# Patient Record
Sex: FEMALE | Race: WHITE | NOT HISPANIC OR LATINO | Employment: PART TIME | URBAN - METROPOLITAN AREA
[De-identification: names, ages, dates, MRNs, and addresses within clinical notes are randomized per-mention and may not be internally consistent; named-entity substitution may affect disease eponyms.]

---

## 2024-03-25 ENCOUNTER — INITIAL PRENATAL (OUTPATIENT)
Dept: OBSTETRICS AND GYNECOLOGY | Facility: CLINIC | Age: 34
End: 2024-03-25
Payer: COMMERCIAL

## 2024-03-25 VITALS — WEIGHT: 177 LBS | BODY MASS INDEX: 25.4 KG/M2 | SYSTOLIC BLOOD PRESSURE: 113 MMHG | DIASTOLIC BLOOD PRESSURE: 75 MMHG

## 2024-03-25 DIAGNOSIS — O09.41 SUPERVISION OF PREGNANCY WITH GRAND MULTIPARITY IN FIRST TRIMESTER (HHS-HCC): Primary | ICD-10-CM

## 2024-03-25 DIAGNOSIS — Z3A.10 10 WEEKS GESTATION OF PREGNANCY (HHS-HCC): ICD-10-CM

## 2024-03-25 PROCEDURE — 0500F INITIAL PRENATAL CARE VISIT: CPT | Performed by: NURSE PRACTITIONER

## 2024-03-25 NOTE — PROGRESS NOTES
Subjective   Patient ID [id number]   Krystina Cates is a 34 y.o.  at Unknown with a working estimated date of delivery of 10/15/24 who presents for an initial prenatal visit. This pregnancy is unplanned but accepted  Pt seen at OB clinic that friends had referred, not sure where she will continue her care.  Oldest daughter is 8yo, and youngest is girl 1.5yr old.    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 for first pregnancy, has not used   gHTN end of last pregnancy  Denies h/o GDM  Last pap 2020 normal and neg HPV      OB History    Para Term  AB Living   8 6 6   1 6   SAB IAB Ectopic Multiple Live Births   1       6      # Outcome Date GA Lbr Sudheer/2nd Weight Sex Delivery Anes PTL Lv   8 Current            7 Term      Vag-Spont   RAYNE   6 Term      Vag-Spont   RAYNE   5 Term      Vag-Spont   RAYNE   4 SAB            3 Term         RAYNE   2 Term      Vag-Spont   RAYNE   1 Term      Vag-Spont   RAYNE          Objective   Physical Exam  Weight: 80.3 kg (177 lb)  Expected Total Weight Gain: Could not be calculated   Pregravid BMI: Could not be calculated  BP: 113/75          OBGyn Exam    Prenatal Labs  Ordered today  V scan done today at bedside with Dr. Landin, showing single IUP live with +FHR    Assessment/Plan   Diagnoses and all orders for this visit:  Supervision of pregnancy with grand multiparity in first trimester  -     C. Trachomatis / N. Gonorrhoeae, Amplified Detection; Future  -     CBC Anemia Panel With Reflex,Pregnancy; Future  -     Hemoglobin Identification with Path Review; Future  -     Hepatitis B surface Ag; Future  -     Hepatitis C Antibody; Future  -     HIV 1/2 Antigen/Antibody Screen with Reflex to Confirmation; Future  -     Rubella IgG; Future  -     Syphilis Screen with Reflex; Future  -     Type And Screen; Future  -     Urine culture; Future  -     US MAC OB imaging order; Future  10 weeks gestation of pregnancy      Prenatal Labs  ordered  Daily prenatal vitamins prescribed  First trimester screening and second trimester screening discussed. Patient decided to complete first check US. Discussed options for cfDNA testing, possible out of pocket costs and implications of genetic testing. Pt declines at this time.  Follow up in 4 weeks for return OB visit.

## 2024-04-12 ENCOUNTER — HOSPITAL ENCOUNTER (OUTPATIENT)
Dept: RADIOLOGY | Facility: CLINIC | Age: 34
Discharge: HOME | End: 2024-04-12
Payer: COMMERCIAL

## 2024-04-12 DIAGNOSIS — O09.41 SUPERVISION OF PREGNANCY WITH GRAND MULTIPARITY IN FIRST TRIMESTER (HHS-HCC): ICD-10-CM

## 2024-04-12 PROCEDURE — 76813 OB US NUCHAL MEAS 1 GEST: CPT | Performed by: OBSTETRICS & GYNECOLOGY

## 2024-04-12 PROCEDURE — 76813 OB US NUCHAL MEAS 1 GEST: CPT

## 2024-04-22 ENCOUNTER — ROUTINE PRENATAL (OUTPATIENT)
Dept: OBSTETRICS AND GYNECOLOGY | Facility: CLINIC | Age: 34
End: 2024-04-22
Payer: COMMERCIAL

## 2024-04-22 VITALS
WEIGHT: 178.9 LBS | DIASTOLIC BLOOD PRESSURE: 72 MMHG | SYSTOLIC BLOOD PRESSURE: 109 MMHG | BODY MASS INDEX: 25.61 KG/M2 | HEIGHT: 70 IN

## 2024-04-22 DIAGNOSIS — O09.42: Primary | ICD-10-CM

## 2024-04-22 DIAGNOSIS — Z3A.14 14 WEEKS GESTATION OF PREGNANCY (HHS-HCC): ICD-10-CM

## 2024-04-22 PROCEDURE — 0501F PRENATAL FLOW SHEET: CPT | Performed by: NURSE PRACTITIONER

## 2024-04-22 NOTE — PROGRESS NOTES
Issues today: feeling better, nausea much improved; First check US done 4/12, NT normal, S=D, needs to complete blood work, urine testing  No Vag bleeding/LOF  Random cramping Ctx/abd pain  No Dysuria/abn discharge  Discussed precautions and when to call  F/U 4 weeks, will schedule anatomy US and complete labs  Ivanna Lane, APRN-CNP

## 2024-05-07 ENCOUNTER — TELEPHONE (OUTPATIENT)
Dept: OBSTETRICS AND GYNECOLOGY | Facility: CLINIC | Age: 34
End: 2024-05-07
Payer: COMMERCIAL

## 2024-05-07 DIAGNOSIS — N90.89 VULVAR IRRITATION: Primary | ICD-10-CM

## 2024-05-07 RX ORDER — TRIAMCINOLONE ACETONIDE 1 MG/G
OINTMENT TOPICAL 2 TIMES DAILY PRN
Qty: 15 G | Refills: 1 | Status: SHIPPED | OUTPATIENT
Start: 2024-05-07

## 2024-05-07 RX ORDER — NYSTATIN 100000 U/G
OINTMENT TOPICAL 2 TIMES DAILY PRN
Qty: 15 G | Refills: 1 | Status: SHIPPED | OUTPATIENT
Start: 2024-05-07 | End: 2025-05-07

## 2024-05-07 NOTE — TELEPHONE ENCOUNTER
Called patient. Patient is 17 weeks pregnant. Patient stated she has some itchiness outside the vaginal on the labia and some redness. Patient stated she has little brown discharge patient informed that could be old blood. Patient denies any clump in her discharge and denies using any new soaps.    ----- Message from Krystina Cates sent at 5/6/2024 10:29 PM EDT -----  Regarding: Possible infection   Contact: [phone]  Lam Goncalves, a few months ago I had a yeast infection and used Monistat to clear it up with no issues. I feel like I have another one coming on but this one feels a little different. The discharge seems to be a little darker brown. This is day 1 of symptoms and I am starting to feel itchy and sore. Not sure if I should be seen or if I should just try the Monistat again. Thanks for your input!

## 2024-05-07 NOTE — TELEPHONE ENCOUNTER
Spoke to pt via phone. Pt states she had intercourse 1-2 days ago. Denies any bleeding, and feels that discharge is more like light tan and not brown or bleeding. Denies any cramping or tightening of abd. States outer labia feel irritated and itching, slight sticky light tan d/charge on underwear. Will try topicals (nystatin and triamcinolone) at this time to treat, discussed and if symptoms worsen or persist, can be seen in office for culture. Pt agrees. Ivanna Lane, APRKEENA-CNP

## 2024-05-21 ENCOUNTER — ROUTINE PRENATAL (OUTPATIENT)
Dept: OBSTETRICS AND GYNECOLOGY | Facility: CLINIC | Age: 34
End: 2024-05-21
Payer: COMMERCIAL

## 2024-05-21 ENCOUNTER — APPOINTMENT (OUTPATIENT)
Dept: OBSTETRICS AND GYNECOLOGY | Facility: CLINIC | Age: 34
End: 2024-05-21
Payer: COMMERCIAL

## 2024-05-21 VITALS
HEIGHT: 70 IN | SYSTOLIC BLOOD PRESSURE: 115 MMHG | BODY MASS INDEX: 26.16 KG/M2 | WEIGHT: 182.7 LBS | DIASTOLIC BLOOD PRESSURE: 76 MMHG

## 2024-05-21 DIAGNOSIS — Z3A.19 19 WEEKS GESTATION OF PREGNANCY (HHS-HCC): Primary | ICD-10-CM

## 2024-05-21 PROCEDURE — 0501F PRENATAL FLOW SHEET: CPT | Performed by: OBSTETRICS & GYNECOLOGY

## 2024-05-21 NOTE — PROGRESS NOTES
"Prenatal visit at 19 weeks and 0 days    Patient denies leaking fluid, bleeding or contractions. Patient admits to good fetal movement.  Patient has not had time to do blood work yet she will try to do it this week.  Anatomy scan scheduled for next week.  Complaining of varicose veins left leg.  Nontender.    Prenatal problem list:    Factor V heterozygous- no lovenox during pregnancy since first child without issue  Previous  x6, full term  H/o gHTN end last pregnancy, no GDM  Normal NT    /76 (BP Location: Left arm, Patient Position: Sitting)   Ht 1.778 m (5' 10\")   Wt 82.9 kg (182 lb 11.2 oz)   LMP 2024 (Exact Date)   BMI 26.21 kg/m²       Assessment and plan: To be scan scheduled, recommend bilateral compression hose of lower extremities, patient instructed to call office if any leg pain noted.  Patient will try to get prenatal blood work this week.  She will follow-up in 4 weeks.  "
severely cachectic/emaciated

## 2024-05-24 ENCOUNTER — HOSPITAL ENCOUNTER (OUTPATIENT)
Dept: RADIOLOGY | Facility: CLINIC | Age: 34
Discharge: HOME | End: 2024-05-24
Payer: COMMERCIAL

## 2024-05-24 DIAGNOSIS — O09.41 SUPERVISION OF PREGNANCY WITH GRAND MULTIPARITY IN FIRST TRIMESTER (HHS-HCC): ICD-10-CM

## 2024-05-24 PROCEDURE — 76805 OB US >/= 14 WKS SNGL FETUS: CPT

## 2024-05-24 PROCEDURE — 76805 OB US >/= 14 WKS SNGL FETUS: CPT | Performed by: OBSTETRICS & GYNECOLOGY

## 2024-06-14 ENCOUNTER — LAB (OUTPATIENT)
Dept: LAB | Facility: LAB | Age: 34
End: 2024-06-14
Payer: COMMERCIAL

## 2024-06-14 ENCOUNTER — HOSPITAL ENCOUNTER (OUTPATIENT)
Dept: RADIOLOGY | Facility: CLINIC | Age: 34
Discharge: HOME | End: 2024-06-14
Payer: COMMERCIAL

## 2024-06-14 DIAGNOSIS — O09.41 SUPERVISION OF PREGNANCY WITH GRAND MULTIPARITY IN FIRST TRIMESTER (HHS-HCC): ICD-10-CM

## 2024-06-14 LAB
ABO GROUP (TYPE) IN BLOOD: NORMAL
ANTIBODY SCREEN: NORMAL
ERYTHROCYTE [DISTWIDTH] IN BLOOD BY AUTOMATED COUNT: 13 % (ref 11.5–14.5)
HBV SURFACE AG SERPL QL IA: NONREACTIVE
HCT VFR BLD AUTO: 35.7 % (ref 36–46)
HCV AB SER QL: NONREACTIVE
HGB BLD-MCNC: 12.1 G/DL (ref 12–16)
HIV 1+2 AB+HIV1 P24 AG SERPL QL IA: NONREACTIVE
MCH RBC QN AUTO: 31.5 PG (ref 26–34)
MCHC RBC AUTO-ENTMCNC: 33.9 G/DL (ref 32–36)
MCV RBC AUTO: 93 FL (ref 80–100)
NRBC BLD-RTO: 0 /100 WBCS (ref 0–0)
PLATELET # BLD AUTO: 181 X10*3/UL (ref 150–450)
RBC # BLD AUTO: 3.84 X10*6/UL (ref 4–5.2)
REFLEX ADDED, ANEMIA PANEL: NORMAL
RH FACTOR (ANTIGEN D): NORMAL
RUBV IGG SERPL IA-ACNC: 1.3 IA
RUBV IGG SERPL QL IA: POSITIVE
TREPONEMA PALLIDUM IGG+IGM AB [PRESENCE] IN SERUM OR PLASMA BY IMMUNOASSAY: NONREACTIVE
WBC # BLD AUTO: 7.1 X10*3/UL (ref 4.4–11.3)

## 2024-06-14 PROCEDURE — 86317 IMMUNOASSAY INFECTIOUS AGENT: CPT

## 2024-06-14 PROCEDURE — 85027 COMPLETE CBC AUTOMATED: CPT

## 2024-06-14 PROCEDURE — 87389 HIV-1 AG W/HIV-1&-2 AB AG IA: CPT

## 2024-06-14 PROCEDURE — 76816 OB US FOLLOW-UP PER FETUS: CPT

## 2024-06-14 PROCEDURE — 87340 HEPATITIS B SURFACE AG IA: CPT

## 2024-06-14 PROCEDURE — 86780 TREPONEMA PALLIDUM: CPT

## 2024-06-14 PROCEDURE — 86900 BLOOD TYPING SEROLOGIC ABO: CPT

## 2024-06-14 PROCEDURE — 87491 CHLMYD TRACH DNA AMP PROBE: CPT

## 2024-06-14 PROCEDURE — 86803 HEPATITIS C AB TEST: CPT

## 2024-06-14 PROCEDURE — 83021 HEMOGLOBIN CHROMOTOGRAPHY: CPT

## 2024-06-14 PROCEDURE — 87591 N.GONORRHOEAE DNA AMP PROB: CPT

## 2024-06-14 PROCEDURE — 86850 RBC ANTIBODY SCREEN: CPT

## 2024-06-14 PROCEDURE — 36415 COLL VENOUS BLD VENIPUNCTURE: CPT

## 2024-06-14 PROCEDURE — 86901 BLOOD TYPING SEROLOGIC RH(D): CPT

## 2024-06-15 LAB
C TRACH RRNA SPEC QL NAA+PROBE: NEGATIVE
N GONORRHOEA DNA SPEC QL PROBE+SIG AMP: NEGATIVE

## 2024-06-18 ENCOUNTER — APPOINTMENT (OUTPATIENT)
Dept: OBSTETRICS AND GYNECOLOGY | Facility: CLINIC | Age: 34
End: 2024-06-18
Payer: COMMERCIAL

## 2024-06-18 LAB
HEMOGLOBIN A2: 3.2 % (ref 2–3.5)
HEMOGLOBIN A: 96.4 % (ref 95.8–98)
HEMOGLOBIN F: 0.4 % (ref 0–2)
HEMOGLOBIN IDENTIFICATION INTERPRETATION: NORMAL
PATH REVIEW-HGB IDENTIFICATION: NORMAL

## 2024-06-28 ENCOUNTER — APPOINTMENT (OUTPATIENT)
Dept: OBSTETRICS AND GYNECOLOGY | Facility: CLINIC | Age: 34
End: 2024-06-28
Payer: COMMERCIAL

## 2024-06-28 VITALS — WEIGHT: 187.2 LBS | BODY MASS INDEX: 26.86 KG/M2 | SYSTOLIC BLOOD PRESSURE: 120 MMHG | DIASTOLIC BLOOD PRESSURE: 75 MMHG

## 2024-06-28 DIAGNOSIS — Z3A.24 24 WEEKS GESTATION OF PREGNANCY (HHS-HCC): Primary | ICD-10-CM

## 2024-06-28 PROCEDURE — 87086 URINE CULTURE/COLONY COUNT: CPT

## 2024-06-28 PROCEDURE — 0501F PRENATAL FLOW SHEET: CPT | Performed by: OBSTETRICS & GYNECOLOGY

## 2024-06-28 NOTE — PROGRESS NOTES
Prenatal visit at 24 weeks and 3 days    Patient denies leaking fluid, bleeding or contractions. Patient admits to good fetal movement.  Follow-up anatomy ultrasound was normal.    Prenatal problem list:    Factor V heterozygous- no lovenox during pregnancy since first child without issue  Previous  x6, full term  H/o gHTN end last pregnancy, no GDM  Normal NT   US Nml anatomy, needs repeat eval of heart views- nml  Repeat growth US at 30w      /75   Wt 84.9 kg (187 lb 3.2 oz)   LMP 2024 (Exact Date)   BMI 26.86 kg/m²     Assessment and plan: Labor precautions, fetal kick counts at home, urine culture and 1 hour Glucola.  Patient will follow-up in 4 weeks.

## 2024-06-30 LAB — BACTERIA UR CULT: NORMAL

## 2024-07-15 ENCOUNTER — APPOINTMENT (OUTPATIENT)
Dept: RADIOLOGY | Facility: CLINIC | Age: 34
End: 2024-07-15
Payer: COMMERCIAL

## 2024-07-29 ENCOUNTER — LAB (OUTPATIENT)
Dept: LAB | Facility: LAB | Age: 34
End: 2024-07-29
Payer: COMMERCIAL

## 2024-07-29 ENCOUNTER — APPOINTMENT (OUTPATIENT)
Dept: OBSTETRICS AND GYNECOLOGY | Facility: CLINIC | Age: 34
End: 2024-07-29
Payer: COMMERCIAL

## 2024-07-29 VITALS
WEIGHT: 190.9 LBS | DIASTOLIC BLOOD PRESSURE: 72 MMHG | BODY MASS INDEX: 27.33 KG/M2 | HEIGHT: 70 IN | SYSTOLIC BLOOD PRESSURE: 124 MMHG

## 2024-07-29 DIAGNOSIS — Z3A.24 24 WEEKS GESTATION OF PREGNANCY (HHS-HCC): ICD-10-CM

## 2024-07-29 DIAGNOSIS — Z3A.28 28 WEEKS GESTATION OF PREGNANCY (HHS-HCC): Primary | ICD-10-CM

## 2024-07-29 LAB
ERYTHROCYTE [DISTWIDTH] IN BLOOD BY AUTOMATED COUNT: 12.5 % (ref 11.5–14.5)
GLUCOSE 1H P 50 G GLC PO SERPL-MCNC: 101 MG/DL
HCT VFR BLD AUTO: 36.1 % (ref 36–46)
HGB BLD-MCNC: 12.1 G/DL (ref 12–16)
MCH RBC QN AUTO: 31.4 PG (ref 26–34)
MCHC RBC AUTO-ENTMCNC: 33.5 G/DL (ref 32–36)
MCV RBC AUTO: 94 FL (ref 80–100)
NRBC BLD-RTO: 0 /100 WBCS (ref 0–0)
PLATELET # BLD AUTO: 157 X10*3/UL (ref 150–450)
RBC # BLD AUTO: 3.85 X10*6/UL (ref 4–5.2)
REFLEX ADDED, ANEMIA PANEL: NORMAL
WBC # BLD AUTO: 7.2 X10*3/UL (ref 4.4–11.3)

## 2024-07-29 PROCEDURE — 82947 ASSAY GLUCOSE BLOOD QUANT: CPT

## 2024-07-29 PROCEDURE — 36415 COLL VENOUS BLD VENIPUNCTURE: CPT

## 2024-07-29 PROCEDURE — 85027 COMPLETE CBC AUTOMATED: CPT

## 2024-07-29 PROCEDURE — 0501F PRENATAL FLOW SHEET: CPT | Performed by: OBSTETRICS & GYNECOLOGY

## 2024-07-29 NOTE — PROGRESS NOTES
"Prenatal visit at 28 weeks and 6 days    Patient denies leaking fluid, bleeding or contractions. Patient admits to good fetal movement.  Patient complaining of congestion and slight cough.  Denies fever or chills.  Physical exam lungs are clear bilaterally.  Patient states kids at home with similar symptoms.  Suspect virus.  Okay to use Claritin and Robitussin.    Prenatal problem list:    Factor V heterozygous- no lovenox during pregnancy since first child without issue  Previous  x6, full term  H/o gHTN end last pregnancy, no GDM  Normal NT   US Nml anatomy, needs repeat eval of heart views- nml  Repeat growth US at 30w      /72 (BP Location: Left arm, Patient Position: Sitting)   Ht 1.778 m (5' 10\")   Wt 86.6 kg (190 lb 14.4 oz)   LMP 2024 (Exact Date)   BMI 27.39 kg/m²       Assessment and plan: Labor precautions, fetal kick counts at home, Tdap deferred today due to upper respiratory symptoms.  Consider next visit.  Follow-up in 2 weeks.  "

## 2024-07-31 NOTE — RESULT ENCOUNTER NOTE
Lam Flaherty,    Just wanted to let you know that your diabetes screen and blood count came back normal. Have a good day!

## 2024-08-08 ENCOUNTER — APPOINTMENT (OUTPATIENT)
Dept: RADIOLOGY | Facility: CLINIC | Age: 34
End: 2024-08-08
Payer: COMMERCIAL

## 2024-08-09 ENCOUNTER — HOSPITAL ENCOUNTER (OUTPATIENT)
Dept: RADIOLOGY | Facility: HOSPITAL | Age: 34
Discharge: HOME | End: 2024-08-09
Payer: COMMERCIAL

## 2024-08-09 DIAGNOSIS — D68.2 FACTOR V DEFICIENCY (MULTI): ICD-10-CM

## 2024-08-09 DIAGNOSIS — O09.41 SUPERVISION OF PREGNANCY WITH GRAND MULTIPARITY IN FIRST TRIMESTER (HHS-HCC): ICD-10-CM

## 2024-08-09 DIAGNOSIS — O36.5930 MATERNAL CARE FOR OTHER KNOWN OR SUSPECTED POOR FETAL GROWTH, THIRD TRIMESTER, NOT APPLICABLE OR UNSPECIFIED (HHS-HCC): ICD-10-CM

## 2024-08-09 PROCEDURE — 76816 OB US FOLLOW-UP PER FETUS: CPT | Performed by: OBSTETRICS & GYNECOLOGY

## 2024-08-09 PROCEDURE — 76816 OB US FOLLOW-UP PER FETUS: CPT

## 2024-08-13 ENCOUNTER — APPOINTMENT (OUTPATIENT)
Dept: OBSTETRICS AND GYNECOLOGY | Facility: CLINIC | Age: 34
End: 2024-08-13
Payer: COMMERCIAL

## 2024-08-15 ENCOUNTER — ROUTINE PRENATAL (OUTPATIENT)
Dept: OBSTETRICS AND GYNECOLOGY | Facility: CLINIC | Age: 34
End: 2024-08-15
Payer: COMMERCIAL

## 2024-08-15 VITALS
DIASTOLIC BLOOD PRESSURE: 71 MMHG | BODY MASS INDEX: 27.62 KG/M2 | HEIGHT: 70 IN | SYSTOLIC BLOOD PRESSURE: 110 MMHG | WEIGHT: 192.9 LBS

## 2024-08-15 DIAGNOSIS — Z71.85 IMMUNIZATION COUNSELING: ICD-10-CM

## 2024-08-15 DIAGNOSIS — Z23 NEED FOR DIPHTHERIA-TETANUS-PERTUSSIS (TDAP) VACCINE: ICD-10-CM

## 2024-08-15 DIAGNOSIS — Z3A.31 31 WEEKS GESTATION OF PREGNANCY (HHS-HCC): Primary | ICD-10-CM

## 2024-08-15 PROCEDURE — 0501F PRENATAL FLOW SHEET: CPT | Performed by: OBSTETRICS & GYNECOLOGY

## 2024-08-15 PROCEDURE — 90715 TDAP VACCINE 7 YRS/> IM: CPT | Performed by: OBSTETRICS & GYNECOLOGY

## 2024-08-15 PROCEDURE — 90471 IMMUNIZATION ADMIN: CPT | Performed by: OBSTETRICS & GYNECOLOGY

## 2024-08-15 NOTE — PROGRESS NOTES
"Prenatal visit at 31 weeks and 2 days    Patient denies leaking fluid, bleeding or contractions. Patient admits to good fetal movement.  Reviewed normal growth ultrasound.    Prenatal problem list:    Factor V heterozygous- no lovenox during pregnancy since first child without issue  Previous  x6, full term  H/o gHTN end last pregnancy, no GDM  Normal NT   US Nml anatomy, needs repeat eval of heart views- nml  Repeat growth US at 30w, nml anatomy    /71 (BP Location: Left arm, Patient Position: Sitting, BP Cuff Size: Large adult)   Ht 1.778 m (5' 10\")   Wt 87.5 kg (192 lb 14.4 oz)   LMP 2024 (Exact Date)   BMI 27.68 kg/m²     Patient was counseled on the recommendation for and benefits of Tdap vaccination during pregnancy.  We discussed the passive immunity that occurs after maternal vaccination during pregnancy, and the antibodies that cross the placenta to the fetus to protect baby in the first few months of life.  Babies do not receive their first vaccine for pertussis/whooping cough until 2 months of life, during which the baby is vulnerable to this bacterial infection.  Whooping cough can cause severe and even life-threatening infections, and maternal vaccination between 27 and 36 weeks of pregnancy is the best method to protect baby from whooping cough until they are eligible for the vaccine.  After discussion the patient accepts the vaccine.  Greater than 5 minutes were spent on counseling related to vaccine recommendations and safety.    Assessment and plan: Labor precautions, fetal kick counts at home, Tdap today, follow-up in 2 weeks.  "

## 2024-08-26 ENCOUNTER — APPOINTMENT (OUTPATIENT)
Dept: OBSTETRICS AND GYNECOLOGY | Facility: CLINIC | Age: 34
End: 2024-08-26
Payer: COMMERCIAL

## 2024-08-26 VITALS — SYSTOLIC BLOOD PRESSURE: 112 MMHG | BODY MASS INDEX: 28.09 KG/M2 | WEIGHT: 195.8 LBS | DIASTOLIC BLOOD PRESSURE: 71 MMHG

## 2024-08-26 DIAGNOSIS — Z3A.32 32 WEEKS GESTATION OF PREGNANCY (HHS-HCC): Primary | ICD-10-CM

## 2024-08-26 PROCEDURE — 0501F PRENATAL FLOW SHEET: CPT | Performed by: OBSTETRICS & GYNECOLOGY

## 2024-08-26 NOTE — PROGRESS NOTES
Prenatal visit at 32 weeks and 6 days    Patient denies leaking fluid, bleeding or contractions. Patient admits to good fetal movement.    Prenatal problem list:    Factor V heterozygous- no lovenox during pregnancy since first child without issue  Previous  x6, full term  H/o gHTN end last pregnancy, no GDM  Normal NT   US Nml anatomy, needs repeat eval of heart views- nml  Repeat growt/h US at 30w, nml anatomy  TDAP Given -> 8/15/24 - AC    Assessment and plan: Labor precautions, fetal kick counts at home, follow-up in 2 weeks.

## 2024-09-09 ENCOUNTER — OFFICE VISIT (OUTPATIENT)
Dept: OBSTETRICS AND GYNECOLOGY | Facility: CLINIC | Age: 34
End: 2024-09-09
Payer: COMMERCIAL

## 2024-09-09 VITALS
WEIGHT: 196.4 LBS | SYSTOLIC BLOOD PRESSURE: 107 MMHG | DIASTOLIC BLOOD PRESSURE: 70 MMHG | BODY MASS INDEX: 28.12 KG/M2 | HEIGHT: 70 IN

## 2024-09-09 DIAGNOSIS — Z3A.34 34 WEEKS GESTATION OF PREGNANCY (HHS-HCC): Primary | ICD-10-CM

## 2024-09-09 PROCEDURE — 87205 SMEAR GRAM STAIN: CPT

## 2024-09-09 PROCEDURE — 3008F BODY MASS INDEX DOCD: CPT | Performed by: OBSTETRICS & GYNECOLOGY

## 2024-09-09 PROCEDURE — 99213 OFFICE O/P EST LOW 20 MIN: CPT | Performed by: OBSTETRICS & GYNECOLOGY

## 2024-09-09 PROCEDURE — 1036F TOBACCO NON-USER: CPT | Performed by: OBSTETRICS & GYNECOLOGY

## 2024-09-09 NOTE — PROGRESS NOTES
"Prenatal visit at 34 weeks and 6 days    Patient denies leaking fluid, bleeding or contractions. Patient admits to good fetal movement.  Complaining of several vaginal infections throughout pregnancy.  Usually diagnosed as yeast, responds well to Monistat vaginal cream then returns within a few weeks.    Prenatal problem list:    Factor V heterozygous- no lovenox during pregnancy since first child without issue  Previous  x6, full term  H/o gHTN end last pregnancy, no GDM  Normal NT   US Nml anatomy, needs repeat eval of heart views- nml  Repeat growt/h US at 30w, nml anatomy  TDAP Given -> 8/15/24 - AC    /70 (BP Location: Left arm, Patient Position: Sitting)   Ht 1.778 m (5' 10\")   Wt 89.1 kg (196 lb 6.4 oz)   LMP 2024 (Exact Date)   BMI 28.18 kg/m²     Pelvic exam: External genitalia mildly erythematous, vagina with a pasty white thick discharge, cervix visually appears closed.  BV and yeast cultures done.    Assessment and plan: Labor precautions, fetal kick counts at home, okay to use Aveeno bath oatmeal and Monistat.  Will contact patient with culture results.  Follow-up in 1 week.  "

## 2024-09-10 ENCOUNTER — APPOINTMENT (OUTPATIENT)
Dept: OBSTETRICS AND GYNECOLOGY | Facility: CLINIC | Age: 34
End: 2024-09-10
Payer: COMMERCIAL

## 2024-09-11 LAB
CLUE CELLS VAG LPF-#/AREA: ABNORMAL /[LPF]
NUGENT SCORE: 0
YEAST VAG WET PREP-#/AREA: PRESENT

## 2024-09-13 ENCOUNTER — TELEPHONE (OUTPATIENT)
Dept: OBSTETRICS AND GYNECOLOGY | Facility: CLINIC | Age: 34
End: 2024-09-13
Payer: COMMERCIAL

## 2024-09-13 DIAGNOSIS — B37.31 YEAST VAGINITIS: Primary | ICD-10-CM

## 2024-09-13 RX ORDER — FLUCONAZOLE 150 MG/1
TABLET ORAL
Qty: 2 TABLET | Refills: 0 | Status: SHIPPED | OUTPATIENT
Start: 2024-09-13

## 2024-09-13 NOTE — TELEPHONE ENCOUNTER
Spoke to patient on the phone.  Reviewed recent vaginal culture positive for yeast.  Patient has been having recurrent yeast infection during pregnancy with only temporary relief with Monistat.  Will treat with Diflucan x 2.  She will follow-up for her routine OB visits.

## 2024-09-13 NOTE — TELEPHONE ENCOUNTER
Dr. Landin,   OB Patient calling for 09-09-24 Vaginal Culture results.  Patient noticed on My Chart that the results are abnormal.  She also was hoping for a return call before the weekend.     Patient number:  [phone].     Thank you

## 2024-09-17 ENCOUNTER — APPOINTMENT (OUTPATIENT)
Dept: OBSTETRICS AND GYNECOLOGY | Facility: CLINIC | Age: 34
End: 2024-09-17
Payer: COMMERCIAL

## 2024-09-17 VITALS — SYSTOLIC BLOOD PRESSURE: 105 MMHG | BODY MASS INDEX: 28.35 KG/M2 | WEIGHT: 197.6 LBS | DIASTOLIC BLOOD PRESSURE: 70 MMHG

## 2024-09-17 DIAGNOSIS — O09.43: Primary | ICD-10-CM

## 2024-09-17 DIAGNOSIS — Z3A.36 36 WEEKS GESTATION OF PREGNANCY (HHS-HCC): ICD-10-CM

## 2024-09-17 PROCEDURE — 87081 CULTURE SCREEN ONLY: CPT

## 2024-09-17 PROCEDURE — 0501F PRENATAL FLOW SHEET: CPT | Performed by: NURSE PRACTITIONER

## 2024-09-17 NOTE — PROGRESS NOTES
Issues today: feeling good, ctx at times, but nothing regular; treated recently for yeast, took second dose yesterday evening and feeling better, worried it will return again  GBS discussed and obtained today  Planning BF, natural family planning, peds in [place]  No Vag bleeding/LOF  Occas Ctx/abd pain  No Dysuria/abn discharge  FM's: active  Discussed PTL precautions/FKC's and when to call  F/U 1 week, GBS sent  WILLOW Brody-CNP

## 2024-09-21 LAB — GP B STREP GENITAL QL CULT: NORMAL

## 2024-09-24 ENCOUNTER — APPOINTMENT (OUTPATIENT)
Dept: OBSTETRICS AND GYNECOLOGY | Facility: CLINIC | Age: 34
End: 2024-09-24
Payer: COMMERCIAL

## 2024-09-24 VITALS — WEIGHT: 197.3 LBS | SYSTOLIC BLOOD PRESSURE: 114 MMHG | DIASTOLIC BLOOD PRESSURE: 73 MMHG | BODY MASS INDEX: 28.31 KG/M2

## 2024-09-24 DIAGNOSIS — O09.43: Primary | ICD-10-CM

## 2024-09-24 DIAGNOSIS — Z3A.37 37 WEEKS GESTATION OF PREGNANCY (HHS-HCC): ICD-10-CM

## 2024-09-24 PROCEDURE — 0501F PRENATAL FLOW SHEET: CPT | Performed by: NURSE PRACTITIONER

## 2024-10-01 ENCOUNTER — ANESTHESIA (OUTPATIENT)
Dept: OBSTETRICS AND GYNECOLOGY | Facility: HOSPITAL | Age: 34
End: 2024-10-01
Payer: COMMERCIAL

## 2024-10-01 ENCOUNTER — ANESTHESIA EVENT (OUTPATIENT)
Dept: OBSTETRICS AND GYNECOLOGY | Facility: HOSPITAL | Age: 34
End: 2024-10-01
Payer: COMMERCIAL

## 2024-10-01 ENCOUNTER — ROUTINE PRENATAL (OUTPATIENT)
Dept: OBSTETRICS AND GYNECOLOGY | Facility: CLINIC | Age: 34
End: 2024-10-01
Payer: COMMERCIAL

## 2024-10-01 ENCOUNTER — HOSPITAL ENCOUNTER (OUTPATIENT)
Dept: VASCULAR MEDICINE | Facility: HOSPITAL | Age: 34
Discharge: HOME | End: 2024-10-01
Payer: COMMERCIAL

## 2024-10-01 ENCOUNTER — HOSPITAL ENCOUNTER (INPATIENT)
Facility: HOSPITAL | Age: 34
LOS: 3 days | Discharge: HOME | End: 2024-10-04
Attending: SPECIALIST | Admitting: SPECIALIST
Payer: COMMERCIAL

## 2024-10-01 VITALS — BODY MASS INDEX: 28.84 KG/M2 | WEIGHT: 201 LBS | DIASTOLIC BLOOD PRESSURE: 75 MMHG | SYSTOLIC BLOOD PRESSURE: 115 MMHG

## 2024-10-01 DIAGNOSIS — R60.0 LOCALIZED EDEMA: ICD-10-CM

## 2024-10-01 DIAGNOSIS — Z3A.38 38 WEEKS GESTATION OF PREGNANCY (HHS-HCC): ICD-10-CM

## 2024-10-01 DIAGNOSIS — O09.43: Primary | ICD-10-CM

## 2024-10-01 DIAGNOSIS — I82.4Y2 ACUTE DEEP VEIN THROMBOSIS (DVT) OF PROXIMAL VEIN OF LEFT LOWER EXTREMITY (MULTI): Primary | ICD-10-CM

## 2024-10-01 DIAGNOSIS — M79.605 ACUTE LEG PAIN, LEFT: ICD-10-CM

## 2024-10-01 DIAGNOSIS — O09.43: ICD-10-CM

## 2024-10-01 PROBLEM — I82.409 DVT (DEEP VENOUS THROMBOSIS) (MULTI): Status: ACTIVE | Noted: 2024-10-01

## 2024-10-01 LAB
ABO GROUP (TYPE) IN BLOOD: NORMAL
ANTIBODY SCREEN: NORMAL
ERYTHROCYTE [DISTWIDTH] IN BLOOD BY AUTOMATED COUNT: 12.8 % (ref 11.5–14.5)
HCT VFR BLD AUTO: 34.7 % (ref 36–46)
HGB BLD-MCNC: 11.4 G/DL (ref 12–16)
MCH RBC QN AUTO: 28.8 PG (ref 26–34)
MCHC RBC AUTO-ENTMCNC: 32.9 G/DL (ref 32–36)
MCV RBC AUTO: 88 FL (ref 80–100)
NRBC BLD-RTO: 0 /100 WBCS (ref 0–0)
PLATELET # BLD AUTO: 164 X10*3/UL (ref 150–450)
RBC # BLD AUTO: 3.96 X10*6/UL (ref 4–5.2)
RH FACTOR (ANTIGEN D): NORMAL
TREPONEMA PALLIDUM IGG+IGM AB [PRESENCE] IN SERUM OR PLASMA BY IMMUNOASSAY: NONREACTIVE
WBC # BLD AUTO: 7.9 X10*3/UL (ref 4.4–11.3)

## 2024-10-01 PROCEDURE — 99223 1ST HOSP IP/OBS HIGH 75: CPT

## 2024-10-01 PROCEDURE — 86901 BLOOD TYPING SEROLOGIC RH(D): CPT

## 2024-10-01 PROCEDURE — 7210000002 HC LABOR PER HOUR

## 2024-10-01 PROCEDURE — 1120000001 HC OB PRIVATE ROOM DAILY

## 2024-10-01 PROCEDURE — 0501F PRENATAL FLOW SHEET: CPT | Performed by: NURSE PRACTITIONER

## 2024-10-01 PROCEDURE — 36415 COLL VENOUS BLD VENIPUNCTURE: CPT

## 2024-10-01 PROCEDURE — 86780 TREPONEMA PALLIDUM: CPT

## 2024-10-01 PROCEDURE — 93970 EXTREMITY STUDY: CPT | Performed by: SURGERY

## 2024-10-01 PROCEDURE — 99221 1ST HOSP IP/OBS SF/LOW 40: CPT | Performed by: STUDENT IN AN ORGANIZED HEALTH CARE EDUCATION/TRAINING PROGRAM

## 2024-10-01 PROCEDURE — 2500000001 HC RX 250 WO HCPCS SELF ADMINISTERED DRUGS (ALT 637 FOR MEDICARE OP)

## 2024-10-01 PROCEDURE — 3E033VJ INTRODUCTION OF OTHER HORMONE INTO PERIPHERAL VEIN, PERCUTANEOUS APPROACH: ICD-10-PCS | Performed by: OBSTETRICS & GYNECOLOGY

## 2024-10-01 PROCEDURE — 85027 COMPLETE CBC AUTOMATED: CPT

## 2024-10-01 PROCEDURE — 2500000004 HC RX 250 GENERAL PHARMACY W/ HCPCS (ALT 636 FOR OP/ED)

## 2024-10-01 PROCEDURE — 2500000005 HC RX 250 GENERAL PHARMACY W/O HCPCS: Performed by: ANESTHESIOLOGY

## 2024-10-01 PROCEDURE — 93970 EXTREMITY STUDY: CPT

## 2024-10-01 PROCEDURE — 3700000014 EPIDURAL BLOCK: Performed by: ANESTHESIOLOGY

## 2024-10-01 RX ORDER — MISOPROSTOL 200 UG/1
800 TABLET ORAL ONCE AS NEEDED
Status: DISCONTINUED | OUTPATIENT
Start: 2024-10-01 | End: 2024-10-02 | Stop reason: HOSPADM

## 2024-10-01 RX ORDER — LOPERAMIDE HYDROCHLORIDE 2 MG/1
4 CAPSULE ORAL EVERY 2 HOUR PRN
Status: DISCONTINUED | OUTPATIENT
Start: 2024-10-01 | End: 2024-10-02 | Stop reason: HOSPADM

## 2024-10-01 RX ORDER — FENTANYL/BUPIVACAINE/NS/PF 2MCG/ML-.1
PLASTIC BAG, INJECTION (ML) INJECTION CONTINUOUS PRN
Status: DISCONTINUED | OUTPATIENT
Start: 2024-10-01 | End: 2024-10-02

## 2024-10-01 RX ORDER — OXYTOCIN 10 [USP'U]/ML
10 INJECTION, SOLUTION INTRAMUSCULAR; INTRAVENOUS ONCE AS NEEDED
Status: DISCONTINUED | OUTPATIENT
Start: 2024-10-01 | End: 2024-10-02 | Stop reason: HOSPADM

## 2024-10-01 RX ORDER — NIFEDIPINE 10 MG/1
10 CAPSULE ORAL ONCE AS NEEDED
Status: DISCONTINUED | OUTPATIENT
Start: 2024-10-01 | End: 2024-10-02 | Stop reason: HOSPADM

## 2024-10-01 RX ORDER — LABETALOL HYDROCHLORIDE 5 MG/ML
20 INJECTION, SOLUTION INTRAVENOUS ONCE AS NEEDED
Status: DISCONTINUED | OUTPATIENT
Start: 2024-10-01 | End: 2024-10-02 | Stop reason: HOSPADM

## 2024-10-01 RX ORDER — OXYTOCIN/0.9 % SODIUM CHLORIDE 30/500 ML
60 PLASTIC BAG, INJECTION (ML) INTRAVENOUS ONCE AS NEEDED
Status: COMPLETED | OUTPATIENT
Start: 2024-10-01 | End: 2024-10-02

## 2024-10-01 RX ORDER — ONDANSETRON HYDROCHLORIDE 2 MG/ML
4 INJECTION, SOLUTION INTRAVENOUS EVERY 6 HOURS PRN
Status: DISCONTINUED | OUTPATIENT
Start: 2024-10-01 | End: 2024-10-02

## 2024-10-01 RX ORDER — FENTANYL/BUPIVACAINE/NS/PF 2MCG/ML-.1
0-54 PLASTIC BAG, INJECTION (ML) INJECTION CONTINUOUS
Status: DISCONTINUED | OUTPATIENT
Start: 2024-10-01 | End: 2024-10-02

## 2024-10-01 RX ORDER — TERBUTALINE SULFATE 1 MG/ML
0.25 INJECTION SUBCUTANEOUS ONCE AS NEEDED
Status: DISCONTINUED | OUTPATIENT
Start: 2024-10-01 | End: 2024-10-02 | Stop reason: HOSPADM

## 2024-10-01 RX ORDER — ACETAMINOPHEN 325 MG/1
975 TABLET ORAL ONCE
Status: COMPLETED | OUTPATIENT
Start: 2024-10-01 | End: 2024-10-01

## 2024-10-01 RX ORDER — LIDOCAINE HYDROCHLORIDE 10 MG/ML
30 INJECTION, SOLUTION INFILTRATION; PERINEURAL ONCE AS NEEDED
Status: DISCONTINUED | OUTPATIENT
Start: 2024-10-01 | End: 2024-10-02 | Stop reason: HOSPADM

## 2024-10-01 RX ORDER — METHYLERGONOVINE MALEATE 0.2 MG/ML
0.2 INJECTION INTRAVENOUS ONCE AS NEEDED
Status: DISCONTINUED | OUTPATIENT
Start: 2024-10-01 | End: 2024-10-02 | Stop reason: HOSPADM

## 2024-10-01 RX ORDER — METOCLOPRAMIDE 10 MG/1
10 TABLET ORAL EVERY 6 HOURS PRN
Status: DISCONTINUED | OUTPATIENT
Start: 2024-10-01 | End: 2024-10-02

## 2024-10-01 RX ORDER — ONDANSETRON 4 MG/1
4 TABLET, FILM COATED ORAL EVERY 6 HOURS PRN
Status: DISCONTINUED | OUTPATIENT
Start: 2024-10-01 | End: 2024-10-02

## 2024-10-01 RX ORDER — CARBOPROST TROMETHAMINE 250 UG/ML
250 INJECTION, SOLUTION INTRAMUSCULAR ONCE AS NEEDED
Status: DISCONTINUED | OUTPATIENT
Start: 2024-10-01 | End: 2024-10-02 | Stop reason: HOSPADM

## 2024-10-01 RX ORDER — OXYTOCIN/0.9 % SODIUM CHLORIDE 30/500 ML
2-30 PLASTIC BAG, INJECTION (ML) INTRAVENOUS CONTINUOUS
Status: DISCONTINUED | OUTPATIENT
Start: 2024-10-01 | End: 2024-10-02

## 2024-10-01 RX ORDER — FENTANYL/BUPIVACAINE/NS/PF 2MCG/ML-.1
PLASTIC BAG, INJECTION (ML) INJECTION AS NEEDED
Status: DISCONTINUED | OUTPATIENT
Start: 2024-10-01 | End: 2024-10-02

## 2024-10-01 RX ORDER — TRANEXAMIC ACID 100 MG/ML
1000 INJECTION, SOLUTION INTRAVENOUS ONCE AS NEEDED
Status: DISCONTINUED | OUTPATIENT
Start: 2024-10-01 | End: 2024-10-02 | Stop reason: HOSPADM

## 2024-10-01 RX ORDER — SODIUM CHLORIDE, SODIUM LACTATE, POTASSIUM CHLORIDE, CALCIUM CHLORIDE 600; 310; 30; 20 MG/100ML; MG/100ML; MG/100ML; MG/100ML
125 INJECTION, SOLUTION INTRAVENOUS CONTINUOUS
Status: DISCONTINUED | OUTPATIENT
Start: 2024-10-01 | End: 2024-10-02

## 2024-10-01 RX ORDER — HYDRALAZINE HYDROCHLORIDE 20 MG/ML
5 INJECTION INTRAMUSCULAR; INTRAVENOUS ONCE AS NEEDED
Status: DISCONTINUED | OUTPATIENT
Start: 2024-10-01 | End: 2024-10-02 | Stop reason: HOSPADM

## 2024-10-01 RX ORDER — METOCLOPRAMIDE HYDROCHLORIDE 5 MG/ML
10 INJECTION INTRAMUSCULAR; INTRAVENOUS EVERY 6 HOURS PRN
Status: DISCONTINUED | OUTPATIENT
Start: 2024-10-01 | End: 2024-10-02

## 2024-10-01 SDOH — ECONOMIC STABILITY: INCOME INSECURITY: HOW HARD IS IT FOR YOU TO PAY FOR THE VERY BASICS LIKE FOOD, HOUSING, MEDICAL CARE, AND HEATING?: NOT VERY HARD

## 2024-10-01 SDOH — SOCIAL STABILITY: SOCIAL INSECURITY
WITHIN THE LAST YEAR, HAVE TO BEEN RAPED OR FORCED TO HAVE ANY KIND OF SEXUAL ACTIVITY BY YOUR PARTNER OR EX-PARTNER?: NO

## 2024-10-01 SDOH — HEALTH STABILITY: MENTAL HEALTH
HOW OFTEN DO YOU NEED TO HAVE SOMEONE HELP YOU WHEN YOU READ INSTRUCTIONS, PAMPHLETS, OR OTHER WRITTEN MATERIAL FROM YOUR DOCTOR OR PHARMACY?: NEVER

## 2024-10-01 SDOH — SOCIAL STABILITY: SOCIAL INSECURITY: VERBAL ABUSE: DENIES

## 2024-10-01 SDOH — SOCIAL STABILITY: SOCIAL INSECURITY: ARE YOU OR HAVE YOU BEEN THREATENED OR ABUSED PHYSICALLY, EMOTIONALLY, OR SEXUALLY BY ANYONE?: NO

## 2024-10-01 SDOH — SOCIAL STABILITY: SOCIAL INSECURITY: WITHIN THE LAST YEAR, HAVE YOU BEEN HUMILIATED OR EMOTIONALLY ABUSED IN OTHER WAYS BY YOUR PARTNER OR EX-PARTNER?: NO

## 2024-10-01 SDOH — ECONOMIC STABILITY: FOOD INSECURITY: WITHIN THE PAST 12 MONTHS, THE FOOD YOU BOUGHT JUST DIDN'T LAST AND YOU DIDN'T HAVE MONEY TO GET MORE.: NEVER TRUE

## 2024-10-01 SDOH — HEALTH STABILITY: MENTAL HEALTH: HAVE YOU USED ANY PRESCRIPTION DRUGS OTHER THAN PRESCRIBED IN THE PAST 12 MONTHS?: NO

## 2024-10-01 SDOH — HEALTH STABILITY: MENTAL HEALTH: SUICIDAL BEHAVIOR (LIFETIME): NO

## 2024-10-01 SDOH — HEALTH STABILITY: MENTAL HEALTH: WERE YOU ABLE TO COMPLETE ALL THE BEHAVIORAL HEALTH SCREENINGS?: NO

## 2024-10-01 SDOH — ECONOMIC STABILITY: HOUSING INSECURITY: DO YOU FEEL UNSAFE GOING BACK TO THE PLACE WHERE YOU ARE LIVING?: NO

## 2024-10-01 SDOH — SOCIAL STABILITY: SOCIAL INSECURITY: WITHIN THE LAST YEAR, HAVE YOU BEEN AFRAID OF YOUR PARTNER OR EX-PARTNER?: NO

## 2024-10-01 SDOH — SOCIAL STABILITY: SOCIAL INSECURITY
WITHIN THE LAST YEAR, HAVE YOU BEEN KICKED, HIT, SLAPPED, OR OTHERWISE PHYSICALLY HURT BY YOUR PARTNER OR EX-PARTNER?: NO

## 2024-10-01 SDOH — SOCIAL STABILITY: SOCIAL INSECURITY: PHYSICAL ABUSE: DENIES

## 2024-10-01 SDOH — ECONOMIC STABILITY: FOOD INSECURITY: WITHIN THE PAST 12 MONTHS, YOU WORRIED THAT YOUR FOOD WOULD RUN OUT BEFORE YOU GOT MONEY TO BUY MORE.: NEVER TRUE

## 2024-10-01 SDOH — SOCIAL STABILITY: SOCIAL INSECURITY: DO YOU FEEL ANYONE HAS EXPLOITED OR TAKEN ADVANTAGE OF YOU FINANCIALLY OR OF YOUR PERSONAL PROPERTY?: NO

## 2024-10-01 SDOH — HEALTH STABILITY: MENTAL HEALTH: HAVE YOU USED ANY SUBSTANCES (CANABIS, COCAINE, HEROIN, HALLUCINOGENS, INHALANTS, ETC.) IN THE PAST 12 MONTHS?: NO

## 2024-10-01 SDOH — SOCIAL STABILITY: SOCIAL INSECURITY: ARE THERE ANY APPARENT SIGNS OF INJURIES/BEHAVIORS THAT COULD BE RELATED TO ABUSE/NEGLECT?: NO

## 2024-10-01 SDOH — HEALTH STABILITY: MENTAL HEALTH: CURRENT SMOKER: 0

## 2024-10-01 SDOH — SOCIAL STABILITY: SOCIAL INSECURITY: HAS ANYONE EVER THREATENED TO HURT YOUR FAMILY OR YOUR PETS?: NO

## 2024-10-01 SDOH — SOCIAL STABILITY: SOCIAL INSECURITY: ABUSE SCREEN: ADULT

## 2024-10-01 SDOH — SOCIAL STABILITY: SOCIAL INSECURITY: HAVE YOU HAD ANY THOUGHTS OF HARMING ANYONE ELSE?: NO

## 2024-10-01 SDOH — HEALTH STABILITY: MENTAL HEALTH: WISH TO BE DEAD (PAST 1 MONTH): NO

## 2024-10-01 SDOH — ECONOMIC STABILITY: TRANSPORTATION INSECURITY
IN THE PAST 12 MONTHS, HAS THE LACK OF TRANSPORTATION KEPT YOU FROM MEDICAL APPOINTMENTS OR FROM GETTING MEDICATIONS?: NO

## 2024-10-01 SDOH — ECONOMIC STABILITY: TRANSPORTATION INSECURITY
IN THE PAST 12 MONTHS, HAS LACK OF TRANSPORTATION KEPT YOU FROM MEETINGS, WORK, OR FROM GETTING THINGS NEEDED FOR DAILY LIVING?: NO

## 2024-10-01 SDOH — SOCIAL STABILITY: SOCIAL INSECURITY: HAVE YOU HAD THOUGHTS OF HARMING ANYONE ELSE?: NO

## 2024-10-01 SDOH — HEALTH STABILITY: MENTAL HEALTH: NON-SPECIFIC ACTIVE SUICIDAL THOUGHTS (PAST 1 MONTH): NO

## 2024-10-01 SDOH — SOCIAL STABILITY: SOCIAL INSECURITY: DOES ANYONE TRY TO KEEP YOU FROM HAVING/CONTACTING OTHER FRIENDS OR DOING THINGS OUTSIDE YOUR HOME?: NO

## 2024-10-01 ASSESSMENT — PAIN SCALES - GENERAL
PAINLEVEL_OUTOF10: 0 - NO PAIN
PAINLEVEL_OUTOF10: 4
PAINLEVEL_OUTOF10: 4
PAINLEVEL_OUTOF10: 0 - NO PAIN
PAINLEVEL_OUTOF10: 1
PAINLEVEL_OUTOF10: 0 - NO PAIN

## 2024-10-01 ASSESSMENT — PATIENT HEALTH QUESTIONNAIRE - PHQ9
1. LITTLE INTEREST OR PLEASURE IN DOING THINGS: NOT AT ALL
2. FEELING DOWN, DEPRESSED OR HOPELESS: NOT AT ALL
SUM OF ALL RESPONSES TO PHQ9 QUESTIONS 1 & 2: 0

## 2024-10-01 ASSESSMENT — PAIN DESCRIPTION - LOCATION: LOCATION: HEAD

## 2024-10-01 ASSESSMENT — LIFESTYLE VARIABLES
AUDIT-C TOTAL SCORE: 0
HOW OFTEN DO YOU HAVE A DRINK CONTAINING ALCOHOL: NEVER
HOW OFTEN DO YOU HAVE 6 OR MORE DRINKS ON ONE OCCASION: NEVER
SKIP TO QUESTIONS 9-10: 1
AUDIT-C TOTAL SCORE: 0
HOW MANY STANDARD DRINKS CONTAINING ALCOHOL DO YOU HAVE ON A TYPICAL DAY: PATIENT DOES NOT DRINK

## 2024-10-01 NOTE — CARE PLAN
Problem: Vaginal Birth or  Section  Goal: Fetal and maternal status remain reassuring during the birth process  Outcome: Progressing  Goal: Prevention of malpresentation/labor dystocia through delivery  Outcome: Progressing  Goal: Demonstrates labor coping techniques through delivery  Outcome: Progressing  Goal: Minimal s/sx of HDP and BP<160/110  Outcome: Progressing  Goal: No s/sx of infection through recovery  Outcome: Progressing  Goal: No s/sx of hemorrhage through recovery  Outcome: Progressing     Problem: Nausea/Vomiting  Goal: Free from nausea/vomiting  Outcome: Progressing     Problem: Pain - Adult  Goal: Verbalizes/displays adequate comfort level or baseline comfort level  Outcome: Progressing     Problem: Safety - Adult  Goal: Free from fall injury  Outcome: Progressing

## 2024-10-01 NOTE — H&P
OB Admission H&P    Assessment/Plan    Krystina Cates is a 34 y.o.  at 38w0d by LMP c/w 13wks who presents for Induction of Labor    New DVT  - Duplex US ordered outpatient 10/1, demonstrated DVT in distal external iliac, common femoral, profunda, proximal femoral, mid femoral, distal femoral and popliteal veins.   - Sent to L&D for further management  - Discussed with MFM team, who recommends proceeding with IOL and then starting therapeutic AC after delivery  - Pt plans for epidural. Will plan to remove epidural after delivery and wait one hour and then start a therapeutic heparin drip    IOL  -Admit to L&D, consented  -T&S, CBC, and Syphilis  -Epidural at patient request  -Recheck as clinically indicated by maternal or fetal status  -Plan to initiate induction with pitocin given favorable cervical exam and multip    Fetal Status  -NST reactive, reassuring   -Presentation cephalic based on ultrasound  -EFW 6.5# by leopolds  -GBS neg    Postpartum:  -Contraception Plan: patient declined    Pregnancy Problems (from 24 to present)       Problem Noted Resolved    Labor and delivery indication for care or intervention ([facility]) 10/1/2024 by Lizy Acosta MD No    Priority:  Medium              Subjective   Good fetal movement.  Denies vaginal bleeding., Denies contractions., Denies leaking of fluid.      Prenatal Provider [place]    Pregnancy notable for:  - Newly diagnosed DVT: on duplex US 10/1  - FVL heterozygote - was on lovenox only in 1st pregnancy  - Grand multip    OB History    Para Term  AB Living   8 6 6 0 1 6   SAB IAB Ectopic Multiple Live Births   1 0 0 0 6      # Outcome Date GA Lbr Sudheer/2nd Weight Sex Type Anes PTL Lv   8 Current            7 Term      Vag-Spont   RAYNE   6 Term      Vag-Spont   RAYNE   5 Term      Vag-Spont   RAYNE   4 SAB            3 Term      Vag-Spont   RAYNE   2 Term      Vag-Spont   RAYNE   1 Term      Vag-Spont   RAYNE       History reviewed. No pertinent  surgical history.    Social History     Tobacco Use    Smoking status: Never    Smokeless tobacco: Never   Substance Use Topics    Alcohol use: Not on file       No Known Allergies    Medications Prior to Admission   Medication Sig Dispense Refill Last Dose    fluconazole (Diflucan) 150 mg tablet Take 1 tablet now then repeat in 72 hours. 2 tablet 0 Past Month    prenatal vit no.124/iron/folic (PRENATAL VITAMIN ORAL) Take by mouth once daily.   9/30/2024    nystatin (Mycostatin) ointment Apply topically 2 times a day as needed (irritation). (Patient not taking: Reported on 10/1/2024) 15 g 1 More than a month    triamcinolone (Kenalog) 0.1 % ointment Apply topically 2 times a day as needed for irritation. (Patient not taking: Reported on 10/1/2024) 15 g 1 More than a month     Objective     Last Vitals  Temp Pulse Resp BP MAP O2 Sat   36.5 °C (97.7 °F) 102 16 136/77 100 98 %     Blood Pressures         10/1/2024  1523             BP: 136/77             Physical Exam  General: NAD, mood appropriate  Cardiopulmonary: warm and well perfused, breathing comfortably on room air  Abdomen: Gravid, non-tender  Extremities: Symmetric  Speculum Exam: deferred  Cervix: 2 /50 /-3      Fetal Monitoring  Baseline: 140 bpm, Variability: moderate,  Accelerations: present and Decelerations: none  Uterine Activity: Irregular contractions  Interpretation: Reactive    Bedside ultrasound: Yes    Labs in chart were reviewed.   Results from last 7 days   Lab Units 10/01/24  1537   WBC AUTO x10*3/uL 7.9   HEMOGLOBIN g/dL 11.4*   HEMATOCRIT % 34.7*   PLATELETS AUTO x10*3/uL 164        Prenatal labs reviewed, not remarkable.

## 2024-10-01 NOTE — PROGRESS NOTES
Issues today: feeling more BH and cramping, good FM. Reports left calf pain, sore cramping, little more edema on that side L>R, no erythema, but noted increased superficial varicosities just below back of knee of left leg >R since yesterday. Skin normal temp to touch bilat. Pt does have h/o Factor V, not on lovenox  No Vag bleeding/LOF  Irreg BH Ctx/abd pain  No Dysuria/abn discharge  FM's: active  Discussed labor precautions/FKC's and when to call  F/U 1 week, doppler LE US today (stat)  Ivanna Lane, APRN-CNP

## 2024-10-01 NOTE — PROGRESS NOTES
Intrapartum Progress Note    Assessment/Plan   Krystina Cates is a 34 y.o.  at 38w0d. RAYO: 10/15/2024, by Last Menstrual Period.     New DVT  - Duplex US ordered outpatient 10/1, demonstrated DVT in distal external iliac, common femoral, profunda, proximal femoral, mid femoral, distal femoral and popliteal veins.   - IOL per MFM, to start therapeutic AC after delivery  - Pt plans for epidural. Will plan to remove epidural after delivery and wait one hour and then start a therapeutic heparin drip     IOL  -SVE /-3 on admission  -Continue pitocin per protocol, currently at 6  -For AROM after epidural placement  -Recheck as clinically indicated by maternal or fetal status     Fetal Status  -NST reactive, reassuring   -Presentation cephalic based on ultrasound  -EFW 6.5# by leopolds  -GBS neg     Postpartum:  -Contraception Plan: patient declined    Seen and discussed w/ Dr. Daniel Cazares MD PGY-2  Obstetrics & Gynecology    Assessment & Plan  Labor and delivery indication for care or intervention ([facility])    DVT (deep venous thrombosis) (Multi)    Pregnancy Problems (from 24 to present)       Problem Noted Resolved    Labor and delivery indication for care or intervention ([facility]) 10/1/2024 by Lizy Acosta MD No    Priority:  Medium              Subjective   Patient comfortable in bed. Reporting new pain in anterior left calf that resolved spontaneously. Still having pain in posterior calf. Wanting to eat and have her epidural placed prior to AROM.    Objective   Last Vitals:  Temp Pulse Resp BP MAP Pulse Ox   36.5 °C (97.7 °F) 102 16 136/77 100 98 %     Vitals Min/Max Last 24 Hours:  Temp  Min: 36.5 °C (97.7 °F)  Max: 36.5 °C (97.7 °F)  Pulse  Min: 102  Max: 102  Resp  Min: 16  Max: 16  BP  Min: 115/75  Max: 136/77  MAP (mmHg)  Min: 100  Max: 100    Intake/Output:  No intake or output data in the 24 hours ending 10/01/24 0338    Physical Examination:  GENERAL: Examination reveals a  well developed, well nourished, gravid female in no acute distress. She is alert and cooperative.  HEENT: External ears normal. Nose normal, no erythema or discharge. Mouth and throat clear.  NECK: supple, no significant adenopathy  LUNGS: Normal respiratory effort  HEART: RRR  ABDOMEN: Gravid  EXTREMITIES: no limitation in range of motion  SKIN: normal coloration and turgor, no rashes  NEUROLOGICAL: alert, oriented, normal speech, no focal findings or movement disorder noted  PSYCHOLOGICAL: awake and alert; oriented to person, place, and time      Lab Review:  Labs in chart were reviewed.

## 2024-10-02 LAB
APTT PPP: 25 SECONDS (ref 27–38)
ERYTHROCYTE [DISTWIDTH] IN BLOOD BY AUTOMATED COUNT: 12.8 % (ref 11.5–14.5)
HCT VFR BLD AUTO: 32.8 % (ref 36–46)
HGB BLD-MCNC: 10.6 G/DL (ref 12–16)
INR PPP: 0.9 (ref 0.9–1.1)
MCH RBC QN AUTO: 28.9 PG (ref 26–34)
MCHC RBC AUTO-ENTMCNC: 32.3 G/DL (ref 32–36)
MCV RBC AUTO: 89 FL (ref 80–100)
NRBC BLD-RTO: 0 /100 WBCS (ref 0–0)
PLATELET # BLD AUTO: 146 X10*3/UL (ref 150–450)
PROTHROMBIN TIME: 10.6 SECONDS (ref 9.8–12.8)
RBC # BLD AUTO: 3.67 X10*6/UL (ref 4–5.2)
UFH PPP CHRO-ACNC: 0.4 IU/ML
UFH PPP CHRO-ACNC: 0.6 IU/ML
UFH PPP CHRO-ACNC: 0.7 IU/ML
WBC # BLD AUTO: 10.5 X10*3/UL (ref 4.4–11.3)

## 2024-10-02 PROCEDURE — 51701 INSERT BLADDER CATHETER: CPT

## 2024-10-02 PROCEDURE — 36415 COLL VENOUS BLD VENIPUNCTURE: CPT

## 2024-10-02 PROCEDURE — 7100000016 HC LABOR RECOVERY PER HOUR

## 2024-10-02 PROCEDURE — 85027 COMPLETE CBC AUTOMATED: CPT

## 2024-10-02 PROCEDURE — 2500000004 HC RX 250 GENERAL PHARMACY W/ HCPCS (ALT 636 FOR OP/ED)

## 2024-10-02 PROCEDURE — 85610 PROTHROMBIN TIME: CPT

## 2024-10-02 PROCEDURE — 2500000004 HC RX 250 GENERAL PHARMACY W/ HCPCS (ALT 636 FOR OP/ED): Performed by: STUDENT IN AN ORGANIZED HEALTH CARE EDUCATION/TRAINING PROGRAM

## 2024-10-02 PROCEDURE — 59050 FETAL MONITOR W/REPORT: CPT

## 2024-10-02 PROCEDURE — 10907ZC DRAINAGE OF AMNIOTIC FLUID, THERAPEUTIC FROM PRODUCTS OF CONCEPTION, VIA NATURAL OR ARTIFICIAL OPENING: ICD-10-PCS

## 2024-10-02 PROCEDURE — 59409 OBSTETRICAL CARE: CPT | Performed by: STUDENT IN AN ORGANIZED HEALTH CARE EDUCATION/TRAINING PROGRAM

## 2024-10-02 PROCEDURE — 2500000004 HC RX 250 GENERAL PHARMACY W/ HCPCS (ALT 636 FOR OP/ED): Performed by: ANESTHESIOLOGIST ASSISTANT

## 2024-10-02 PROCEDURE — 2500000001 HC RX 250 WO HCPCS SELF ADMINISTERED DRUGS (ALT 637 FOR MEDICARE OP)

## 2024-10-02 PROCEDURE — 59410 OBSTETRICAL CARE: CPT

## 2024-10-02 PROCEDURE — 85730 THROMBOPLASTIN TIME PARTIAL: CPT

## 2024-10-02 PROCEDURE — 85520 HEPARIN ASSAY: CPT

## 2024-10-02 PROCEDURE — 7210000002 HC LABOR PER HOUR

## 2024-10-02 PROCEDURE — 1100000001 HC PRIVATE ROOM DAILY

## 2024-10-02 RX ORDER — DIPHENHYDRAMINE HCL 25 MG
25 CAPSULE ORAL EVERY 6 HOURS PRN
Status: DISCONTINUED | OUTPATIENT
Start: 2024-10-02 | End: 2024-10-04 | Stop reason: HOSPADM

## 2024-10-02 RX ORDER — ACETAMINOPHEN 325 MG/1
975 TABLET ORAL EVERY 6 HOURS
Status: DISCONTINUED | OUTPATIENT
Start: 2024-10-02 | End: 2024-10-04 | Stop reason: HOSPADM

## 2024-10-02 RX ORDER — CARBOPROST TROMETHAMINE 250 UG/ML
250 INJECTION, SOLUTION INTRAMUSCULAR ONCE AS NEEDED
Status: DISCONTINUED | OUTPATIENT
Start: 2024-10-02 | End: 2024-10-04 | Stop reason: HOSPADM

## 2024-10-02 RX ORDER — LABETALOL HYDROCHLORIDE 5 MG/ML
20 INJECTION, SOLUTION INTRAVENOUS ONCE AS NEEDED
Status: DISCONTINUED | OUTPATIENT
Start: 2024-10-02 | End: 2024-10-04 | Stop reason: HOSPADM

## 2024-10-02 RX ORDER — ENOXAPARIN SODIUM 150 MG/ML
1.5 INJECTION SUBCUTANEOUS EVERY 24 HOURS
Status: CANCELLED | OUTPATIENT
Start: 2024-10-02

## 2024-10-02 RX ORDER — BISACODYL 10 MG/1
10 SUPPOSITORY RECTAL DAILY PRN
Status: DISCONTINUED | OUTPATIENT
Start: 2024-10-02 | End: 2024-10-04 | Stop reason: HOSPADM

## 2024-10-02 RX ORDER — HEPARIN SODIUM 10000 [USP'U]/100ML
0-4500 INJECTION, SOLUTION INTRAVENOUS CONTINUOUS
Status: DISCONTINUED | OUTPATIENT
Start: 2024-10-02 | End: 2024-10-03

## 2024-10-02 RX ORDER — OXYTOCIN 10 [USP'U]/ML
10 INJECTION, SOLUTION INTRAMUSCULAR; INTRAVENOUS ONCE AS NEEDED
Status: DISCONTINUED | OUTPATIENT
Start: 2024-10-02 | End: 2024-10-04 | Stop reason: HOSPADM

## 2024-10-02 RX ORDER — ENOXAPARIN SODIUM 150 MG/ML
1.5 INJECTION SUBCUTANEOUS EVERY 24 HOURS
Status: DISCONTINUED | OUTPATIENT
Start: 2024-10-02 | End: 2024-10-04 | Stop reason: HOSPADM

## 2024-10-02 RX ORDER — MISOPROSTOL 200 UG/1
800 TABLET ORAL ONCE AS NEEDED
Status: DISCONTINUED | OUTPATIENT
Start: 2024-10-02 | End: 2024-10-04 | Stop reason: HOSPADM

## 2024-10-02 RX ORDER — ONDANSETRON 4 MG/1
4 TABLET, FILM COATED ORAL EVERY 6 HOURS PRN
Status: DISCONTINUED | OUTPATIENT
Start: 2024-10-02 | End: 2024-10-04 | Stop reason: HOSPADM

## 2024-10-02 RX ORDER — ADHESIVE BANDAGE
10 BANDAGE TOPICAL
Status: DISCONTINUED | OUTPATIENT
Start: 2024-10-02 | End: 2024-10-04 | Stop reason: HOSPADM

## 2024-10-02 RX ORDER — DIPHENHYDRAMINE HYDROCHLORIDE 50 MG/ML
25 INJECTION INTRAMUSCULAR; INTRAVENOUS EVERY 6 HOURS PRN
Status: DISCONTINUED | OUTPATIENT
Start: 2024-10-02 | End: 2024-10-04 | Stop reason: HOSPADM

## 2024-10-02 RX ORDER — HYDRALAZINE HYDROCHLORIDE 20 MG/ML
5 INJECTION INTRAMUSCULAR; INTRAVENOUS ONCE AS NEEDED
Status: DISCONTINUED | OUTPATIENT
Start: 2024-10-02 | End: 2024-10-04 | Stop reason: HOSPADM

## 2024-10-02 RX ORDER — LIDOCAINE HYDROCHLORIDE AND EPINEPHRINE 15; 5 MG/ML; UG/ML
INJECTION, SOLUTION EPIDURAL AS NEEDED
Status: DISCONTINUED | OUTPATIENT
Start: 2024-10-02 | End: 2024-10-02

## 2024-10-02 RX ORDER — LIDOCAINE 560 MG/1
1 PATCH PERCUTANEOUS; TOPICAL; TRANSDERMAL
Status: DISCONTINUED | OUTPATIENT
Start: 2024-10-02 | End: 2024-10-04 | Stop reason: HOSPADM

## 2024-10-02 RX ORDER — ONDANSETRON HYDROCHLORIDE 2 MG/ML
4 INJECTION, SOLUTION INTRAVENOUS EVERY 6 HOURS PRN
Status: DISCONTINUED | OUTPATIENT
Start: 2024-10-02 | End: 2024-10-04 | Stop reason: HOSPADM

## 2024-10-02 RX ORDER — NIFEDIPINE 10 MG/1
10 CAPSULE ORAL ONCE AS NEEDED
Status: DISCONTINUED | OUTPATIENT
Start: 2024-10-02 | End: 2024-10-04 | Stop reason: HOSPADM

## 2024-10-02 RX ORDER — IBUPROFEN 600 MG/1
600 TABLET ORAL EVERY 6 HOURS
Status: DISCONTINUED | OUTPATIENT
Start: 2024-10-02 | End: 2024-10-03

## 2024-10-02 RX ORDER — OXYTOCIN/0.9 % SODIUM CHLORIDE 30/500 ML
60 PLASTIC BAG, INJECTION (ML) INTRAVENOUS ONCE AS NEEDED
Status: DISCONTINUED | OUTPATIENT
Start: 2024-10-02 | End: 2024-10-04 | Stop reason: HOSPADM

## 2024-10-02 RX ORDER — POLYETHYLENE GLYCOL 3350 17 G/17G
17 POWDER, FOR SOLUTION ORAL 2 TIMES DAILY PRN
Status: DISCONTINUED | OUTPATIENT
Start: 2024-10-02 | End: 2024-10-04 | Stop reason: HOSPADM

## 2024-10-02 RX ORDER — LOPERAMIDE HYDROCHLORIDE 2 MG/1
4 CAPSULE ORAL EVERY 2 HOUR PRN
Status: DISCONTINUED | OUTPATIENT
Start: 2024-10-02 | End: 2024-10-04 | Stop reason: HOSPADM

## 2024-10-02 RX ORDER — METHYLERGONOVINE MALEATE 0.2 MG/ML
0.2 INJECTION INTRAVENOUS ONCE AS NEEDED
Status: DISCONTINUED | OUTPATIENT
Start: 2024-10-02 | End: 2024-10-04 | Stop reason: HOSPADM

## 2024-10-02 RX ORDER — TRANEXAMIC ACID 100 MG/ML
1000 INJECTION, SOLUTION INTRAVENOUS ONCE AS NEEDED
Status: DISCONTINUED | OUTPATIENT
Start: 2024-10-02 | End: 2024-10-04 | Stop reason: HOSPADM

## 2024-10-02 RX ORDER — SIMETHICONE 80 MG
80 TABLET,CHEWABLE ORAL 4 TIMES DAILY PRN
Status: DISCONTINUED | OUTPATIENT
Start: 2024-10-02 | End: 2024-10-04 | Stop reason: HOSPADM

## 2024-10-02 ASSESSMENT — PAIN SCALES - GENERAL
PAINLEVEL_OUTOF10: 0 - NO PAIN
PAINLEVEL_OUTOF10: 2
PAINLEVEL_OUTOF10: 0 - NO PAIN
PAINLEVEL_OUTOF10: 2
PAINLEVEL_OUTOF10: 0 - NO PAIN
PAINLEVEL_OUTOF10: 0 - NO PAIN
PAINLEVEL_OUTOF10: 2
PAINLEVEL_OUTOF10: 0 - NO PAIN

## 2024-10-02 ASSESSMENT — PAIN DESCRIPTION - LOCATION: LOCATION: ABDOMEN

## 2024-10-02 ASSESSMENT — PAIN DESCRIPTION - DESCRIPTORS
DESCRIPTORS: CRAMPING
DESCRIPTORS: CRAMPING

## 2024-10-02 NOTE — SIGNIFICANT EVENT
At bedside to evaluate bleeding. Per nurse, has a small trickle with fundal massage. Has had 150cc out since delivery for a total EBL of 300cc. On my exam, small amounts of dark clot with fundal massage, good fundal tone. BSUS showing thin endometrial stripe throughout. No evidence of retained products. Ok for epidural removal.    Discussed with Dr. Hema Cazares MD PGY-2  Obstetrics & Gynecology

## 2024-10-02 NOTE — ANESTHESIA PREPROCEDURE EVALUATION
Patient: Krystina Cates    Evaluation Method: In-person visit    Procedure Information    Date: 10/01/24  Procedure: Labor Consult       34 y.o.  at 38w0d    Relevant Problems   Anesthesia (within normal limits)      Cardiac (within normal limits)      Pulmonary (within normal limits)      Neuro (within normal limits)      GI (within normal limits)      /Renal (within normal limits)      Liver (within normal limits)      Endocrine (within normal limits)      Hematology   (+) DVT (deep venous thrombosis) (Multi)      Musculoskeletal (within normal limits)     History reviewed. No pertinent surgical history.  The patient has had previous epidural anesthesia (x1) without complications.  '  Clinical information reviewed:   Tobacco  Allergies  Meds   Med Hx  Surg Hx   Fam Hx  Soc Hx        NPO Detail:  No data recorded     OB/Gyn Evaluation    Present Pregnancy    Patient is pregnant now.   Obstetric History            Physical Exam    Airway  Mallampati: II  TM distance: >3 FB  Neck ROM: full     Cardiovascular   Rhythm: regular  Rate: normal     Dental - normal exam     Pulmonary - normal exam     Abdominal          Anesthesia Plan    History of general anesthesia?: no  History of complications of general anesthesia?: no    ASA 3     epidural   (Discussed risks and benefits of epidural with patient. Discussed with OB team regarding patient's desire for epidural in setting of DVT- per OB team they would start treatment of DVT after delivery/ 1 hour after removal of epidural. )  The patient is not a current smoker.    Anesthetic plan and risks discussed with patient.  Use of blood products discussed with patient who consented to blood products.    Plan discussed with attending and CAA.

## 2024-10-02 NOTE — SIGNIFICANT EVENT
"Labor Progress Note    Subjective: Patient resting comfortably with epidural infusing. Amenable to AROM.    Objective:  Vitals: /67   Pulse 70   Temp 36.8 °C (98.2 °F) (Oral)   Resp 16   Ht 1.778 m (5' 10\")   Wt 91.2 kg (201 lb 1 oz)   LMP 01/09/2024 (Exact Date)   SpO2 98%   BMI 28.85 kg/m²   Cervical Exam  Dilation: 3  Effacement (%): 60  Fetal Station: -3  Presentation: Vertex (ultrasound)  Method: Manual  OB Examiner: Sage PRADO  Fetal Assessment  Movement: Present  Mode: External US  Baseline Fetal Heart Rate (bpm): 135 bpm  Baseline Classification: Normal  Variability: Moderate (Between 6 and 25 BPM)  Pattern: Accelerations  Pattern Observations: patient back on monitor  FHR Category: Category I  Multiple Births: No     AROM'd for clear  Continue pitocin per protocol  CEFM, currently Category I  Epidural infusing    Alma Cazares MD PGY-2  Obstetrics & Gynecology    "

## 2024-10-02 NOTE — LACTATION NOTE
Lactation Consultant Note  Lactation Consultation  Reason for Consult: Initial assessment  Consultant Name: Leatha Gonzalez RN, IBCLC    Maternal Information  Has mother  before?: Yes  How long did the mother previously breastfeed?: 4 months- 1 year range with all of her children  Infant to breast within first 2 hours of birth?: Yes  Exclusive Pump and Bottle Feed: No    Maternal Assessment       Infant Assessment  Infant Behavior: Deep sleep, Content after feeding  Infant Assessment: Other (Comment) (d/f - baby just finished feeding)    Feeding Assessment  Nutrition Source: Breastmilk  Feeding Method: Nursing at the breast  Unable to assess infant feeding at this time: Other (Comment) (mom just finished feeding at the breast and baby appears content at this time)    LATCH TOOL       Breast Pump       Other OB Lactation Tools       Patient Follow-up  Inpatient Lactation Follow-up Needed : Yes  Outpatient Lactation Follow-up: Recommended    Other OB Lactation Documentation  Infant Risk Factors: Early term birth 37-39 weeks    Recommendations/Summary   Baby around 6 hours of life at time of visit.   I did not view a latch at this time.   Mom just finished feeding at the breast.   Mom stated she is able to independently latch the baby to the breast and denies any pain with the latch.     Mom had breast fed all of her children; ranging from 4 months to a year. She has a goal of breast feeding this baby for a year.     Reviewed feeding cues, the normal feeding patterns in the first 24 hours of life, the role of colostrum, output on different days of life, milk production/ supply in the first few days of life, and the benefits of skin to skin.      Encouraged mom to breastfeed on demand with a goal of 8-12 feeds in a 24 hour period.   If baby is not showing feeding cues and it has been 3 hours since the last time the baby was fed or the last time the baby attempted to feed, encouraged her to place baby in  skin to skin.      Encouraged her to call for assistance, if needed.    Mom has a breast pump for at home.     Encouraged her to utilize the outpatient lactation resources, if needed.   Contact information given.   [phone] Vin or [phone] Krys

## 2024-10-02 NOTE — L&D DELIVERY NOTE
OB Delivery Note  10/2/2024  Krystina Cates  34 y.o.   Vaginal, Spontaneous     Spontaneous vaginal delivery. EBL 150cc. No tears. Good fundal tone.    Gestational Age: 38w1d  /Para:   Quantitative Blood Loss: Admission to Discharge: 300 mL (10/1/2024  2:43 PM - 10/2/2024  5:44 AM)    Nathaniel Cates [[id number]]      Labor Events    Sac identifier: Sac 1  Rupture date/time: 10/2/2024 0037  Rupture type: Artificial  Fluid color: Clear  Fluid odor: None  Labor type: Induced Onset of Labor  Labor allowed to proceed with plans for an attempted vaginal birth?: Yes  Induction: Oxytocin, AROM  Induction date/time: 10/2/2024 0037  Induction indications: Other  Complications: None       Labor Event Times    Labor onset date/time: 10/1/2024 1443  Dilation complete date/time: 10/2/2024 0316  Start pushing date/time: 10/2/2024 032       Placenta    Placenta delivery date/time: 10/2/2024 0331  Placenta removal: Spontaneous  Placenta appearance: Intact  Placenta disposition: discarded       Cord    Vessels: 3 vessels  Complications: None  Delayed cord clamping?: Yes  Cord clamped date/time: 10/2/2024 03:28:00  Cord blood disposition: Lab, Refrigerator  Gases sent?: No  Stem cell collection (by provider): No       Lacerations    Episiotomy: None  Perineal laceration: None  Other lacerations?: No  Repair suture: None       Anesthesia    Method: Epidural       Operative Delivery    Forceps attempted?: No  Vacuum extractor attempted?: No       Shoulder Dystocia    Shoulder dystocia present?: No       [place] Delivery    Birth date/time: 10/2/2024 03:27:00  Delivery type: Vaginal, Spontaneous  Complications: None       Resuscitation    Method: Suctioning, Tactile stimulation       Apgars    Living status: Living  Apgar Component Scores:  1 min.:  5 min.:  10 min.:  15 min.:  20 min.:    Skin color:  0  1       Heart rate:  2  2       Reflex irritability:  2  2       Muscle tone:  2  2       Respiratory effort:  2  2        Total:  8  9       Apgars assigned by: KATHRIN STREETER RN       Delivery Providers    Delivering clinician: Rudy Leslie MD   Provider Role    Krystina Torrez, RN Delivery Nurse    Jeanne Streeter, RN Nursery Nurse    Alma Cazares MD Resident    Nelli Bran, VIRY Delivery Nurse                   Alma Cazares MD

## 2024-10-02 NOTE — ANESTHESIA PROCEDURE NOTES
Epidural Block    Patient location during procedure: OB  Start time: 10/1/2024 9:51 PM  End time: 10/1/2024 10:22 PM  Reason for block: labor analgesia  Staffing  Performed: attending   Authorized by: Mohit Collins MD    Performed by: Mohit Collins MD    Preanesthetic Checklist  Completed: patient identified, IV checked, site marked, risks and benefits discussed, surgical consent, monitors and equipment checked, pre-op evaluation, timeout performed and sterile techniques followed  Block Timeout  RN/Licensed healthcare professional reads aloud to the Anesthesia provider and entire team: Patient identity, procedure with side and site, patient position, and as applicable the availability of implants/special equipment/special requirements.  Patient on coagulant treatment: no  Timeout performed at: 10/1/2024 9:51 PM  Block Placement  Patient position: sitting  Prep: ChloraPrep  Sterility prep: cap, drape, gloves and mask  Sedation level: no sedation  Patient monitoring: heart rate, continuous pulse oximetry and blood pressure  Approach: midline  Local numbing: lidocaine 1% to skin and subcutaneous tissues  Vertebral space: lumbar  Lumbar location: L3-L4  Epidural  Loss of resistance technique: saline  Guidance: landmark technique        Needle  Needle type: Tuohy   Needle gauge: 17  Needle length: 9 cm  Needle insertion depth: 4.5 cm  Catheter type: end hole  Catheter size: 20 G  Catheter at skin depth: 8.5 cm  Catheter securement method: clear occlusive dressing    Test dose: lidocaine 1.5% with epinephrine 1-to-200,000  Test dose: lidocaine 1.5% with epinephrine 1-to-200,000  Test dose result: no positive test dose    PCEA  Medication concentration used: 0.044% Bupivacaine with 1.25 mcg/mL Fentanyl and 1:[id number] Epinephrine  Dose (mL): 10  Lockout (minutes): 15  1-Hour Limit (boluses/hr): 4  Basal Rate: 14        Assessment  Sensory level: T10 bilateral  Block outcome: patient comfortable  Number of attempts:  2  Events: no positive test dose  Procedure assessment: patient tolerated procedure well with no immediate complications

## 2024-10-03 PROCEDURE — 2500000001 HC RX 250 WO HCPCS SELF ADMINISTERED DRUGS (ALT 637 FOR MEDICARE OP)

## 2024-10-03 PROCEDURE — 2500000004 HC RX 250 GENERAL PHARMACY W/ HCPCS (ALT 636 FOR OP/ED): Performed by: STUDENT IN AN ORGANIZED HEALTH CARE EDUCATION/TRAINING PROGRAM

## 2024-10-03 PROCEDURE — 99232 SBSQ HOSP IP/OBS MODERATE 35: CPT | Performed by: STUDENT IN AN ORGANIZED HEALTH CARE EDUCATION/TRAINING PROGRAM

## 2024-10-03 PROCEDURE — 2500000004 HC RX 250 GENERAL PHARMACY W/ HCPCS (ALT 636 FOR OP/ED)

## 2024-10-03 PROCEDURE — 1100000001 HC PRIVATE ROOM DAILY

## 2024-10-03 ASSESSMENT — PAIN SCALES - GENERAL
PAINLEVEL_OUTOF10: 0 - NO PAIN
PAINLEVEL_OUTOF10: 2
PAINLEVEL_OUTOF10: 0 - NO PAIN

## 2024-10-03 ASSESSMENT — PAIN DESCRIPTION - DESCRIPTORS: DESCRIPTORS: CRAMPING

## 2024-10-03 NOTE — ANESTHESIA POSTPROCEDURE EVALUATION
Krystina Cates is a 34 y.o., , who had a Vaginal, Spontaneous delivery on 10/2/2024 at 38w1d and is now POD1.    She had Neuraxial Anesthesia without immediate complications noted.       Pain well controlled    Vitals:    10/03/24 0524   BP: 119/79   Pulse: 57   Resp: 16   Temp: 36.8 °C (98.2 °F)   SpO2: 100%       Neuraxial site assessed. No visible redness or swelling or drainage. Patient able to ambulate and move all extremities without difficulty. Able to void. No complaints of nausea/vomiting. Tolerating PO intake well. No s/sx of PDPH. Pt was diagnosed with LLE DVT and is being treated with Lovenox. Epidural removed safely before heparin was initiated.     Anesthesia will sign off     Lama Abundio MD

## 2024-10-03 NOTE — PROGRESS NOTES
Postpartum Progress Note    Assessment/Plan   Krystina Cates is a 34 y.o., , who delivered at 38w1d gestation and is now postpartum day 1.    34 y.o.  PPD#1 s/p  c/b L DVT dx prior to IOL, doing well.    PPD#1  - pain well controlled with PO pain meds  - fundus firm and nontender  - vitals wnl, ambulating, tolerating regular diet, voiding spontaneously  - minimal lochia, continue to monitor for sign/sxs of anemia   - continue routine postpartum care     New DVT  - Duplex US ordered outpatient 10/1, demonstrated DVT in distal external iliac, common femoral, profunda, proximal femoral, mid femoral, distal femoral and popliteal veins.  - FVL heterozygote   - s/p hep gtt  - now transitioned to therapeutic lovenox  - plan for vascular medicine outpatient  - likely at least 3-6 months AC     Feeding   - breast, doing well  - Lactation consult prn     Postpartum Birth control  - declines     Dispo:   - ok for discharge on PPD2 if meeting all milestones    d/w Dr. Nabila Alonso MD      Assessment & Plan  Labor and delivery indication for care or intervention ([facility])    DVT (deep venous thrombosis) (Multi)    Pregnancy Problems (from 24 to present)       Problem Noted Resolved    Labor and delivery indication for care or intervention ([facility]) 10/1/2024 by Lizy Acosta MD No    Priority:  Medium            Hospital course: induced d/t new deep vein thrombosis-left , FVL heterozygote    Subjective   Her pain is well controlled with current medications  She is ambulating well  She is tolerating a Adult diet Regular  She reports no breast or nursing problems  She denies emotional concerns today   Her plan for contraception is none      Objective   Allergies:   Patient has no known allergies.         Last Vitals:  Temp Pulse Resp BP MAP Pulse Ox   36.8 °C (98.2 °F) 57 16 119/79 85 100 %     Vitals Min/Max Last 24 Hours:  Temp  Min: 36.7 °C (98.1 °F)  Max: 37 °C (98.6 °F)  Pulse  Min:  57  Max: 102  Resp  Min: 16  Max: 20  BP  Min: 106/66  Max: 119/79  MAP (mmHg)  Min: 79  Max: 88    Intake/Output:     Intake/Output Summary (Last 24 hours) at 10/3/2024 0655  Last data filed at 10/2/2024 0801  Gross per 24 hour   Intake --   Output 450 ml   Net -450 ml     Physical exam:  General:  AAOx3, No acute distress  Cardiovascular: Warm and well perfused  Respiratory: Normal respiratory effort   Abdominal:  Soft, non-tender  Extremities: No edema, no calf tenderness   Skin: No rashes or lesions visualized     Lab Data:  Lab Results   Component Value Date    WBC 10.5 10/02/2024    HGB 10.6 (L) 10/02/2024    HCT 32.8 (L) 10/02/2024     (L) 10/02/2024

## 2024-10-03 NOTE — CARE PLAN
Problem: Postpartum  Goal: Appropriate maternal -  bonding  Outcome: Progressing     Problem: Postpartum  Goal: Establish and maintain infant feeding pattern for adequate nutrition  Outcome: Progressing     Problem: Postpartum  Goal: Minimal s/sx of HDP and BP<160/110  Outcome: Progressing

## 2024-10-03 NOTE — LACTATION NOTE
Lactation Consultant Note  Lactation Consultation  Reason for Consult: Follow-up assessment  Consultant Name: Leatha Gonzalez RN, IBCLC    Maternal Information       Maternal Assessment  Breast Assessment: Large, Symmetrical, Soft, Compressible, Other (Comment)  Nipple Assessment: Intact, Erect, Other (Comment) (tips slightly reddened- reviewed deep latch and nipple care)  Areola Assessment: Normal    Infant Assessment  Infant Behavior: Suckles on and off, needs stimulation, Content after feeding, Other (Comment) (feeding)    Feeding Assessment  Nutrition Source: Breastmilk  Feeding Method: Nursing at the breast  Feeding Position: Cradle, C - hold, One side per feeding, Nipple to nose, Mother needs assistance with latch/positioning, Misalignment of baby's head, trunk, and hips, Other (Comment) (minimal assistance with a slightly deeper latch)  Suck/Feeding: Sustained, Coordinated suck/swallow/breathe, Tactile stimulation needed, Audible swallowing with stimulaton, Other (Comment) (end of feeding)  Latch Assessment: Minimal assistance is needed, Instructed on deep latch, Areolar attachment, Comfortable with no pain, Flanged lips    LATCH TOOL  Latch: Grasps breast, tongue down, lips flanged, rhythmic sucking  Audible Swallowing: A few with stimulation  Type of Nipple: Everted (After stimulation)  Comfort (Breast/Nipple): Soft/non-tender  Hold (Positioning): Minimal assist, teach one side, mother does other, staff holds  LATCH Score: 8    Breast Pump       Other OB Lactation Tools       Patient Follow-up  Outpatient Lactation Follow-up: Recommended    Other OB Lactation Documentation  Infant Risk Factors: Early term birth 37-39 weeks    Recommendations/Summary  Observed a feed.   Mom already had the baby latched to the breast when I entered the room.   Minimal assistance offered- positioning of the baby tummy to tummy (better alignment) and minimal assistance provided for a slightly deeper latch.   Showed mom  how to adjust the latch to the deeper latch for her comfort and for proper transfer of milk.     Encouraged her to call out for assistance with feeds, if needed.     Reviewed feeding cues, breat feeding on demand, output on different days of life, average percentage of weight loss, milk production/ supply, and the other breastfeeding education topics.      Encouraged mom to breastfeed on demand with a goal of 8-12 feeds in a 24 hour period.   If baby is not showing feeding cues and it has been 3 hours since the last time the baby was fed or the last time the baby attempted to feed, encouraged her to place baby in skin to skin.      Mom has breast pump for at home.     Encouraged her to utilize the outpatient lactation resources, if needed.   Contact information given.   [phone] Vin or [phone] Krys

## 2024-10-04 VITALS
WEIGHT: 201.06 LBS | OXYGEN SATURATION: 98 % | BODY MASS INDEX: 28.78 KG/M2 | DIASTOLIC BLOOD PRESSURE: 81 MMHG | RESPIRATION RATE: 16 BRPM | TEMPERATURE: 98.6 F | HEART RATE: 73 BPM | SYSTOLIC BLOOD PRESSURE: 127 MMHG | HEIGHT: 70 IN

## 2024-10-04 PROCEDURE — 2500000001 HC RX 250 WO HCPCS SELF ADMINISTERED DRUGS (ALT 637 FOR MEDICARE OP)

## 2024-10-04 RX ORDER — POLYETHYLENE GLYCOL 3350 17 G/17G
17 POWDER, FOR SOLUTION ORAL DAILY
Qty: 30 PACKET | Refills: 0 | Status: SHIPPED | OUTPATIENT
Start: 2024-10-04 | End: 2024-11-03

## 2024-10-04 RX ORDER — ACETAMINOPHEN 325 MG/1
975 TABLET ORAL EVERY 6 HOURS
Qty: 360 TABLET | Refills: 0 | Status: SHIPPED | OUTPATIENT
Start: 2024-10-04 | End: 2024-11-03

## 2024-10-04 RX ORDER — ENOXAPARIN SODIUM 150 MG/ML
1.5 INJECTION SUBCUTANEOUS EVERY 24 HOURS
Qty: 30 EACH | Refills: 2 | Status: SHIPPED | OUTPATIENT
Start: 2024-10-04 | End: 2025-01-02

## 2024-10-04 RX ORDER — FERROUS SULFATE 325(65) MG
325 TABLET ORAL
Qty: 30 TABLET | Refills: 1 | Status: SHIPPED | OUTPATIENT
Start: 2024-10-04 | End: 2024-12-03

## 2024-10-04 ASSESSMENT — PAIN SCALES - GENERAL
PAINLEVEL_OUTOF10: 0 - NO PAIN
PAINLEVEL_OUTOF10: 2
PAINLEVEL_OUTOF10: 2

## 2024-10-04 ASSESSMENT — PAIN DESCRIPTION - DESCRIPTORS: DESCRIPTORS: CRAMPING

## 2024-10-04 NOTE — DISCHARGE SUMMARY
Discharge Summary    Admission Date: 10/1/2024  Discharge Date: 10/04/24  Discharge Diagnosis: Labor and delivery indication for care or intervention ([facility])       Patient Active Problem List   Diagnosis    Labor and delivery indication for care or intervention ([facility])    DVT (deep venous thrombosis) (Multi)         Hospital Course  Krystina Cates is a 34 y.o.,       Initially presented for: IOL in s/o newly dx DVTs    Admission Date: 10/1/2024    Delivery Date: 10/2/2024 3:27 AM    Delivery type: Vaginal, Spontaneous     GA at delivery: 38w1d    Outcome: Living    Anesthesia during delivery: Epidural    Intrapartum complications: None    Feeding method: Breastfeeding Status: Yes    Contraception: declines bridge method and discussed pregnancy spacing of at least one year, abstaining from intercourse for 6wks, and the ability to become pregnant in the absence of regular menses. Pt verbalized understanding    Rhogam: The patient's blood type is B POS. Rhogam is not indicated.     Now postpartum day: 2.    Hospital course n/f:      Newly diagnosed DVTs  - duplex US ordered outpatient 10/1, demonstrated DVT in distal external iliac, common femoral, profunda, proximal femoral, mid femoral, distal femoral and popliteal veins  - MFM recommended IOL/delivery and started AC in PP  - s/p Heparin gtt  - rx for therapeutic Lovenox and follow up with hematology/vascular medicine outpatient    Elevated BP w/o dx HDP  - mild range BP readings <4 hrs apart at time of delivery  - asx      PP course otherwise uneventful.  Meeting all postpartum milestones- ambulating independently, passing flatus, tolerating PO intake, lochia light, voiding spontaneously, and pain well controlled with PO meds.       Dispo  OK for discharge today    - The signs and symptoms of PEC were reviewed with the patient, including unrelenting headache, vision changes/blurred vision, and RUQ pain  - BP cuff for home for checking BP twice a day  -  Pt instructed to call primary OB if SBP > 160 or DBP > 110 or if development of PEC symptoms   - Follow up with primary OB in:       - 2-5 days for BP check       - 4-6 weeks for post-partum visit       Pertinent Physical Exam At Time of Discharge  General: well appearing, well nourished, postpartum  Obstetric: fundus firm below umbilicus, lochia light  Skin: Warm, dry; no rashes/lesions/erythema  Neuro: A/Ox3, conversational, no gross motor deficit   GI: no distension, appropriately tender, soft  Respiratory: Even and unlabored on RA  Cardiovascular: Trace BLE edema; No erythema, warmth  Psych: appropriate mood and affect         Your medication list        START taking these medications        Instructions Last Dose Given Next Dose Due   acetaminophen 325 mg tablet  Commonly known as: Tylenol      Take 3 tablets (975 mg) by mouth every 6 hours.       enoxaparin 150 mg/mL injection  Commonly known as: Lovenox      Inject 0.91 mL (136.5 mg) under the skin once every 24 hours.       ferrous sulfate (325 mg ferrous sulfate) tablet      Take 1 tablet (325 mg) by mouth once daily with breakfast.       polyethylene glycol 17 gram packet  Commonly known as: Glycolax, Miralax      Take 17 g by mouth once daily.              CONTINUE taking these medications        Instructions Last Dose Given Next Dose Due   PRENATAL VITAMIN ORAL                  STOP taking these medications      fluconazole 150 mg tablet  Commonly known as: Diflucan               ASK your doctor about these medications        Instructions Last Dose Given Next Dose Due   nystatin ointment  Commonly known as: Mycostatin      Apply topically 2 times a day as needed (irritation).       triamcinolone 0.1 % ointment  Commonly known as: Kenalog      Apply topically 2 times a day as needed for irritation.                 Where to Get Your Medications        These medications were sent to [facility]/pharmacy #[id number] [facility] [id number] [place] [address]  [address]  [id number] [place] [address]      Phone: [phone]   acetaminophen 325 mg tablet  enoxaparin 150 mg/mL injection  ferrous sulfate (325 mg ferrous sulfate) tablet  polyethylene glycol 17 gram packet             Outpatient Follow-Up  Future Appointments   Date Time Provider Department Center   11/18/2024  3:00 PM Morgan Landin MD [id number]        spent 35 minutes in the professional and overall care of this patient      Magui Suh, APRN-CNP

## 2024-10-04 NOTE — LACTATION NOTE
Lactation Consultant Note  Lactation Consultation  Reason for Consult: Follow-up assessment  Consultant Name: Kelley Alfaro RN, IBCLC    Maternal Information  Has mother  before?: Yes  Infant to breast within first 2 hours of birth?: Yes  Exclusive Pump and Bottle Feed: No    Maternal Assessment  Breast Assessment: Medium, Pendulous, Soft, Warm, Compressible, Filling  Nipple Assessment: Intact, Sore, Erect  Areola Assessment: Normal    Infant Assessment  Infant Behavior: Light sleep  Infant Assessment:  (deferred)    Feeding Assessment  Nutrition Source: Breastmilk  Feeding Method: Nursing at the breast    LATCH TOOL       Breast Pump       Other OB Lactation Tools       Patient Follow-up       Other OB Lactation Documentation       Recommendations/Summary  Upon entering the room, mother states infant was cluster feeding overnight and this morning, feels her nipples are sore and is having a hard time manually flanging infant's lower lip out by pulling down on chin. Reviewed how nipple could be sore from shallow latching to just/mostly tip of nipple and how the goal is for infant to latch deeper with areola in mouth as well. Encouraged mother to present breast to infant in breast sandwich, aiming nipple to roof of infant's mouth. Also suggested moving infant to football hold to maybe change the angle of infant's mouth at the breast which might not add to existing soreness. Educated mother on set up of infant in football hold using pillows for support. Mother using lanolin at this time for sore nipples. Comfort gels brought to mother as well to promote healing. Encouraged to call me with infant's next feed to observe and/or assist as needed. Mother has a pump for home use however has not received one through insurance with this child or last child which is almost 2 years old. Will send insurance information to [employer] and then maybe mother can receive a new breast pump. Plan is for discharge this  afternoon/evening.

## 2024-10-08 ENCOUNTER — APPOINTMENT (OUTPATIENT)
Dept: OBSTETRICS AND GYNECOLOGY | Facility: CLINIC | Age: 34
End: 2024-10-08
Payer: COMMERCIAL

## 2024-10-15 ENCOUNTER — TELEPHONE (OUTPATIENT)
Dept: OBSTETRICS AND GYNECOLOGY | Facility: CLINIC | Age: 34
End: 2024-10-15
Payer: COMMERCIAL

## 2024-10-15 NOTE — TELEPHONE ENCOUNTER
Patient called and she delivered Oct. 2.  But was told she has DVT.  She wanted you to be aware and should she be doing anything.  Please advise.  Thank you

## 2024-10-25 NOTE — TELEPHONE ENCOUNTER
Dr. Landin spoke to patient about the DVT, and patient has a scheduled appointment with Dr. Guevara coming up in the next week.

## 2024-11-11 ENCOUNTER — APPOINTMENT (OUTPATIENT)
Dept: VASCULAR SURGERY | Facility: CLINIC | Age: 34
End: 2024-11-11
Payer: COMMERCIAL

## 2024-11-15 ENCOUNTER — OFFICE VISIT (OUTPATIENT)
Dept: VASCULAR SURGERY | Facility: CLINIC | Age: 34
End: 2024-11-15
Payer: COMMERCIAL

## 2024-11-15 VITALS
HEIGHT: 70 IN | WEIGHT: 191 LBS | OXYGEN SATURATION: 98 % | SYSTOLIC BLOOD PRESSURE: 130 MMHG | BODY MASS INDEX: 27.35 KG/M2 | DIASTOLIC BLOOD PRESSURE: 80 MMHG | HEART RATE: 93 BPM

## 2024-11-15 DIAGNOSIS — I82.402 ACUTE DEEP VEIN THROMBOSIS (DVT) OF LEFT LOWER EXTREMITY, UNSPECIFIED VEIN (MULTI): Primary | ICD-10-CM

## 2024-11-15 PROCEDURE — 1036F TOBACCO NON-USER: CPT | Performed by: SURGERY

## 2024-11-15 PROCEDURE — 99204 OFFICE O/P NEW MOD 45 MIN: CPT | Performed by: SURGERY

## 2024-11-15 PROCEDURE — 3008F BODY MASS INDEX DOCD: CPT | Performed by: SURGERY

## 2024-11-18 ENCOUNTER — APPOINTMENT (OUTPATIENT)
Dept: OBSTETRICS AND GYNECOLOGY | Facility: CLINIC | Age: 34
End: 2024-11-18
Payer: COMMERCIAL

## 2024-11-18 VITALS — SYSTOLIC BLOOD PRESSURE: 115 MMHG | DIASTOLIC BLOOD PRESSURE: 76 MMHG

## 2024-11-18 PROCEDURE — 0503F POSTPARTUM CARE VISIT: CPT | Performed by: OBSTETRICS & GYNECOLOGY

## 2024-11-18 ASSESSMENT — EDINBURGH POSTNATAL DEPRESSION SCALE (EPDS)
THINGS HAVE BEEN GETTING ON TOP OF ME: NO, MOST OF THE TIME I HAVE COPED QUITE WELL
I HAVE FELT SAD OR MISERABLE: NO, NOT AT ALL
I HAVE BEEN ABLE TO LAUGH AND SEE THE FUNNY SIDE OF THINGS: AS MUCH AS I ALWAYS COULD
I HAVE BLAMED MYSELF UNNECESSARILY WHEN THINGS WENT WRONG: NOT VERY OFTEN
TOTAL SCORE: 4
THE THOUGHT OF HARMING MYSELF HAS OCCURRED TO ME: NEVER
I HAVE LOOKED FORWARD WITH ENJOYMENT TO THINGS: AS MUCH AS I EVER DID
I HAVE BEEN SO UNHAPPY THAT I HAVE HAD DIFFICULTY SLEEPING: NOT AT ALL
I HAVE FELT SCARED OR PANICKY FOR NO GOOD REASON: NO, NOT AT ALL
I HAVE BEEN ANXIOUS OR WORRIED FOR NO GOOD REASON: HARDLY EVER
I HAVE BEEN SO UNHAPPY THAT I HAVE BEEN CRYING: ONLY OCCASIONALLY

## 2024-11-18 NOTE — PROGRESS NOTES
Subjective   Patient ID: Krystina Cates is a 34 y.o. female who presents for Postpartum Follow-up (Pt in office for 6wk postpartum visit/Pt has no other concerns/complaints/Pt decline chaperone).  HPI  Patient presents for postpartum visit.    Patient is status post induction of labor on October 1, 2024 at 38 weeks.  Patient was diagnosed with deep venous thrombosis.  Known history of factor V Leyden.  Had spontaneous vaginal delivery and subsequently started on Lovenox.  Has followed up with vascular surgery and has appointments coming up with hematology.  She admits to regular bowel movements denies any urinary symptoms.  Currently breast-feeding without any issues.  Very minimal vaginal bleeding denies any pelvic pain.  Denies postpartum depression.  Review of Systems    Objective   Physical Exam  Gen.: Alert and in no acute distress. Well-developed, well-nourished.  Thyroid: Nonenlarged and no palpable thyroid nodules  Cardiovascular: Heart regular rate and rhythm  Pulmonary: Clear bilateral breath sounds  Breasts: Deferred abdomen: Soft and nontender, no abdominal mass palpated, no organomegaly   Pelvic: External genitalia normal, Bartholin's urethral and [place]'s glands normal. Vagina normal. Cervix normal. Uterus normal size and shape. Right adnexa normal without masses. Left adnexa normal without masses. Perianal area and normal.  Assessment/Plan   Normal postpartum check, known history of DVT now on Lovenox follows up with vascular surgery.  Has appointment with hematology.  I will see her back in mid January 2025.         Morgan Landin MD 11/18/24 3:55 PM

## 2024-11-19 DIAGNOSIS — I82.4Y2 ACUTE DEEP VEIN THROMBOSIS (DVT) OF PROXIMAL VEIN OF LEFT LOWER EXTREMITY (MULTI): ICD-10-CM

## 2024-11-19 NOTE — PROGRESS NOTES
"Krystina Cates is a 34 y.o. female     Subjective   This patient was seen on consult for evaluation of postpartum extensive DVT left leg.  Following her delivery, she did have a venous duplex scan which showed these results.  She states that her left leg is doing relatively well.  She was placed on therapeutic Lovenox immediately after her venous duplex scan was done.  She denies any chest pain or shortness of breath.  This was her seventh delivery.         Objective     Vitals:    11/15/24 0900   BP: 130/80   Pulse: 93   SpO2: 98%      Physical Exam  This patient is alert and oriented x 3.  She is in no acute distress.  Head is normocephalic.  Pupils are equal and reactive to light accommodation.  Neck is soft and supple without palpable lymph nodes.  Heart is regular rate.  Lungs are clear.  Abdomen is soft with positive bowel sounds there is no pain on palpation.  Upper and lower extremities seem to have adequate range of motion with palpable brachial radial femoral popliteal and pedal pulses.  Her left lower leg volume is slightly larger than the right.  Psychologically she appears to be acting appropriately.  Blood pressure 130/80, pulse 93, height 1.778 m (5' 10\"), weight 86.6 kg (191 lb), last menstrual period 01/09/2024, SpO2 98%, currently breastfeeding.            Patient Active Problem List    Diagnosis Date Noted    Labor and delivery indication for care or intervention ([facility]) 10/01/2024    DVT (deep venous thrombosis) (Multi) 10/01/2024          Current Outpatient Medications:     enoxaparin (Lovenox) 150 mg/mL injection, Inject 0.91 mL (136.5 mg) under the skin once every 24 hours., Disp: 30 each, Rfl: 2    ferrous sulfate, 325 mg ferrous sulfate, tablet, Take 1 tablet (325 mg) by mouth once daily with breakfast., Disp: 30 tablet, Rfl: 1    nystatin (Mycostatin) ointment, Apply topically 2 times a day as needed (irritation). (Patient not taking: Reported on 10/1/2024), Disp: 15 g, Rfl: 1    " prenatal vit no.124/iron/folic (PRENATAL VITAMIN ORAL), Take by mouth once daily., Disp: , Rfl:     triamcinolone (Kenalog) 0.1 % ointment, Apply topically 2 times a day as needed for irritation. (Patient not taking: Reported on 10/1/2024), Disp: 15 g, Rfl: 1     Lab Results   Component Value Date    WBC 10.5 10/02/2024    HGB 10.6 (L) 10/02/2024    HCT 32.8 (L) 10/02/2024     (L) 10/02/2024    ALT 10 10/06/2022    AST 13 10/06/2022     10/06/2022    K 3.9 10/06/2022     10/06/2022    CREATININE 0.66 10/06/2022    BUN 11 10/06/2022    CO2 25 10/06/2022    INR 0.9 10/02/2024       Vascular US lower extremity venous duplex bilateral    Result Date: 10/1/2024           [place] [address] Tel [phone] and Fax [phone]  Vascular Lab Report VASC US LOWER EXTREMITY VENOUS DUPLEX BILATERAL  Patient Name:      MEGAN LOCKETT RIC      Reading Physician:  [id number] Mila Wilson MD, RPVI Study Date:        10/1/2024            Ordering Physician: [id number] COLLIER MRN/PID:           [id number]             Technologist:       Felicia Andrade RVT Accession#:        [id number]         Technologist 2: Date of Birth/Age: 1990 / 34 years Encounter#:         [id number] Gender:            F Admission Status:  Outpatient           Location Performed: [facility]  Diagnosis/ICD: Localized (leg) edema-R60.0 Indication:    Limb edema CPT Codes:     47984 Peripheral venous duplex scan for DVT complete  Patient History 38 weeks pregnant                 Factor V Heterozygous.  **CRITICAL RESULT** Critical Result: DVT - left lower extremity Notification called to WILLOW Cho - CNP on 10/1/2024 at 11:53:21 AM by PATRICIA Andrade RVT.  CONCLUSIONS: Right Lower Venous: No evidence of acute deep vein thrombus visualized in the right lower extremity.  Left Lower Venous: There is acute occlusive deep vein thrombosis visualized in the distal external iliac, common femoral, profunda, proximal femoral, mid femoral, distal femoral and popliteal veins. Venous flow is noted within the distal left Popliteal vein.  Imaging & Doppler Findings:  Right                 Compressible Thrombus        Flow Distal External Iliac     Yes        None   Spontaneous/Phasic CFV                       Yes        None   Spontaneous/Phasic PFV                       Yes        None FV Proximal               Yes        None   Spontaneous/Phasic FV Mid                    Yes        None FV Distal                 Yes        None Popliteal                 Yes        None   Spontaneous/Phasic Peroneal                  Yes        None PTV                       Yes        None  Left                  Compress    Thrombus     Flow Distal External Iliac    No    Acute occlusive None CFV                      No    Acute occlusive None PFV                      No    Acute occlusive FV Proximal              No    Acute occlusive None FV Mid                   No    Acute occlusive FV Distal                No    Acute occlusive Popliteal                No    Acute occlusive None Peroneal                Yes         None PTV                     Yes         None  [id number] Mila Wilson MD, DULCE Electronically signed by [id number] Mila Wilson MD, DULCE on 10/1/2024 at 5:47:32 PM  ** Final **                 Assessment/Plan   Active Problems:  There are no active Hospital Problems.      This patient presents today as a new consult for evaluation of extensive deep vein thrombosis that was diagnosed 2 days after delivery of her seventh child.  The venous duplex scan was done on 10/1/2024.  She was immediately started on therapeutic dose of Lovenox.  The ultrasound diagnosed clot is high as the external iliac vein.  More than likely, this was the result of the gravid uterus compressing the iliac vein.  I am encouraging her  to start wearing at least a mild  compression stocking.  She has not been treated for over a month with therapeutic Lovenox.  At this time, I would like to refer her to hematology for further treatment and conversion to oral anticoagulation.  I would like to repeat a venous duplex scan in January and follow-up with me.  She needs to periodically elevate her left leg above her heart when she is able to rest.  I do feel that this puts her at relatively high risk for any further pregnancies.  Thank you very much for allowing me to take part in the care of your patient.  Sincerely years, Dr. Solo Guevara, DO

## 2024-11-25 ENCOUNTER — OFFICE VISIT (OUTPATIENT)
Dept: HEMATOLOGY/ONCOLOGY | Facility: CLINIC | Age: 34
End: 2024-11-25
Payer: COMMERCIAL

## 2024-11-25 VITALS
OXYGEN SATURATION: 99 % | DIASTOLIC BLOOD PRESSURE: 78 MMHG | TEMPERATURE: 97.9 F | WEIGHT: 189.15 LBS | HEART RATE: 80 BPM | BODY MASS INDEX: 27.14 KG/M2 | SYSTOLIC BLOOD PRESSURE: 123 MMHG | RESPIRATION RATE: 20 BRPM

## 2024-11-25 DIAGNOSIS — I82.402 DEEP VEIN THROMBOSIS (DVT) OF LEFT LOWER EXTREMITY, UNSPECIFIED CHRONICITY, UNSPECIFIED VEIN (MULTI): Primary | ICD-10-CM

## 2024-11-25 PROCEDURE — 99205 OFFICE O/P NEW HI 60 MIN: CPT | Performed by: INTERNAL MEDICINE

## 2024-11-25 PROCEDURE — 99215 OFFICE O/P EST HI 40 MIN: CPT | Performed by: INTERNAL MEDICINE

## 2024-11-25 ASSESSMENT — PATIENT HEALTH QUESTIONNAIRE - PHQ9
1. LITTLE INTEREST OR PLEASURE IN DOING THINGS: NOT AT ALL
SUM OF ALL RESPONSES TO PHQ9 QUESTIONS 1 AND 2: 0
2. FEELING DOWN, DEPRESSED OR HOPELESS: NOT AT ALL

## 2024-11-25 ASSESSMENT — COLUMBIA-SUICIDE SEVERITY RATING SCALE - C-SSRS
1. IN THE PAST MONTH, HAVE YOU WISHED YOU WERE DEAD OR WISHED YOU COULD GO TO SLEEP AND NOT WAKE UP?: NO
2. HAVE YOU ACTUALLY HAD ANY THOUGHTS OF KILLING YOURSELF?: NO
6. HAVE YOU EVER DONE ANYTHING, STARTED TO DO ANYTHING, OR PREPARED TO DO ANYTHING TO END YOUR LIFE?: NO

## 2024-11-25 ASSESSMENT — PAIN SCALES - GENERAL: PAINLEVEL_OUTOF10: 0-NO PAIN

## 2024-11-26 NOTE — PROGRESS NOTES
Patient ID: : Krystina Cates            Primary Care Provider: No Assigned PCP Generic MD Gabrielle    LOCATION:  [facility]    REFERRING PHYSICIAN:  Dr. Solo Guevara    HEMATOLOGY ONCOLOGY PROBLEMS:  1.  Coagulopathy        a.  Heterozygous factor V Leiden mutation.  Tested as part of family screening       b.  Left upper leg DVT in Oct 2024 at the time of delivery.       c.  Maintained on Lovenox    CHIEF COMPLAINTS:  Patient is in the clinic today for evaluation of left upper leg DVT.    HISTORY:  Krystina Cates is a 34 y.o. female with known history of heterozygous factor V Leiden mutation.  Testing was done as part of family history of present grandmother were diagnosed with factor V Leiden mutation.  She never had any problem with thromboembolic complication but was treated with Lovenox during her first pregnancy prophylactically.  She had 5 additional pregnancies without any complication and without any prophylactic treatment.  During her seventh pregnancy she developed left leg discomfort near just prior to her delivery.  Doppler ultrasound from 10/1/2024 showed a acute occlusive DVT in the distal external iliac, common femoral, profunda, proximal femoral, mid femoral, distal femoral and popliteal veins.  She was immediately started on heparin and had a normal vaginal delivery.  Postdelivery she is maintained on Lovenox.      INTERVAL HISTORY:  She is currently breast-feeding.  Denies any new complaints. No history of nausea, vomiting, fever, night sweats, diarrhea, rash, anorexia or weight loss. No recent changes in medications.    PAST MEDICAL HISTORY:  1.  Heterozygous factor V Leiden mutation.  2.  Left upper leg DVT in October 2024  3.  7 normal pregnancies and deliveries    SOCIAL HISTORY:   for 12 years and lives in [place].  Non-smoker.  Rare social alcohol intake.  She is a homemaker.  Born and raised in [place].    FAMILY HISTORY:  Parents in their  60s in good health.  1 brother and 7 children all alive and well.  Her mother, maternal grandmother and a cousin and also been noted to have heterozygous factor V Leiden mutation.  No other family history of bleeding, clotting or malignant disorders in the family history.    REVIEW OF SYSTEMS:   Pertinent finding as per the history above.  All other systems have been reviewed and generally negative and noncontributory.    VITAL SIGNS  BSA: 2.06 meters squared  /78   Pulse 80   Temp 36.6 °C (97.9 °F)   Resp 20   Wt 85.8 kg (189 lb 2.5 oz)   SpO2 99%   BMI 27.14 kg/m²     PHYSICAL EXAMINATION:  Detailed physical examination not done.    LAB DATA:  CBC at the time of delivery on 10/2/2024 was unremarkable with baseline hemoglobin of 10.6 and platelets of 146.    RADIOLOGICAL DATA:  Doppler ultrasound results from 10/1/2024 were reviewed and have been detailed in the history above.    ASSESSMENT / PLAN:  1.  Coagulopathy.  Please refer the details as outlined above.  In summary patient with known history of history of negative factor V Leiden mutation, which was tested as part of family screening after her cousin and maternal grandmother were diagnosed with factor V Leiden mutation.  She never had any previous problems with thromboembolic complications during her 6 pregnancies.  During her first pregnancy she was treated prophylactically with Lovenox as a precaution but never needed any additional prophylactic treatment during other 5 pregnancies.  During her seventh pregnancy she developed left leg discomfort near just prior to her delivery.  Doppler ultrasound from 10/1/2024 showed a acute occlusive DVT in the distal external iliac, common femoral, profunda, proximal femoral, mid femoral, distal femoral and popliteal veins.  She was immediately started on heparin and had a normal vaginal delivery.  Postdelivery she is maintained on Lovenox.      Long discussion with patient and she is explained that  metformin she will have to continue with the Lovenox especially since she is breast-feeding.  In about 2 to 3 months time we will recheck the Doppler ultrasound and if there is complete resolution of the DVT then we can consider holding the Lovenox and checking additional complete hypercoagulable workup.  Depending upon her clinical course and additional testing results we will decide about the need for long-term anticoagulation.  Most likely she has a provoked event due to pregnancy in the setting of baseline heterozygous factor V Leiden mutation.  There are no clear-cut guidelines but I think there will be need for lifelong anticoagulation at this time unless there are some additional abnormal findings.    2.  Follow-up.  She will return to clinic in about 2 to 3 months time and will be scheduled for follow-up Doppler ultrasound just prior to her next visit.    A total of 60 minutes were spent in evaluation - performing physical examination, history taking, review of the previous extensive records/information and formulating current and future management plan. Majority of the time was spend in consultation and discussion.    This note has been transcribed using Dragon voice recognition system and there is a possibility of unintentional typing misprints.

## 2024-12-17 ENCOUNTER — APPOINTMENT (OUTPATIENT)
Dept: HEMATOLOGY/ONCOLOGY | Facility: CLINIC | Age: 34
End: 2024-12-17
Payer: COMMERCIAL

## 2024-12-31 ENCOUNTER — APPOINTMENT (OUTPATIENT)
Dept: CARDIOLOGY | Facility: CLINIC | Age: 34
End: 2024-12-31
Payer: COMMERCIAL

## 2025-01-07 ENCOUNTER — HOSPITAL ENCOUNTER (OUTPATIENT)
Dept: VASCULAR MEDICINE | Facility: CLINIC | Age: 35
Discharge: HOME | End: 2025-01-07
Payer: COMMERCIAL

## 2025-01-07 DIAGNOSIS — I82.4Y2 ACUTE DEEP VEIN THROMBOSIS (DVT) OF PROXIMAL VEIN OF LEFT LOWER EXTREMITY (MULTI): ICD-10-CM

## 2025-01-07 DIAGNOSIS — I82.409 ACUTE EMBOLISM AND THROMBOSIS OF UNSPECIFIED DEEP VEINS OF UNSPECIFIED LOWER EXTREMITY (MULTI): ICD-10-CM

## 2025-01-07 PROCEDURE — 93971 EXTREMITY STUDY: CPT

## 2025-01-07 PROCEDURE — 93971 EXTREMITY STUDY: CPT | Performed by: SURGERY

## 2025-01-20 ENCOUNTER — OFFICE VISIT (OUTPATIENT)
Dept: HEMATOLOGY/ONCOLOGY | Facility: CLINIC | Age: 35
End: 2025-01-20
Payer: COMMERCIAL

## 2025-01-20 ENCOUNTER — APPOINTMENT (OUTPATIENT)
Dept: OBSTETRICS AND GYNECOLOGY | Facility: CLINIC | Age: 35
End: 2025-01-20
Payer: COMMERCIAL

## 2025-01-20 VITALS
WEIGHT: 192.1 LBS | BODY MASS INDEX: 27.5 KG/M2 | DIASTOLIC BLOOD PRESSURE: 77 MMHG | SYSTOLIC BLOOD PRESSURE: 116 MMHG | HEIGHT: 70 IN

## 2025-01-20 VITALS
OXYGEN SATURATION: 99 % | RESPIRATION RATE: 20 BRPM | DIASTOLIC BLOOD PRESSURE: 75 MMHG | SYSTOLIC BLOOD PRESSURE: 129 MMHG | TEMPERATURE: 98.4 F | HEART RATE: 82 BPM

## 2025-01-20 DIAGNOSIS — I82.592 CHRONIC DEEP VEIN THROMBOSIS (DVT) OF OTHER VEIN OF LEFT LOWER EXTREMITY: Primary | ICD-10-CM

## 2025-01-20 DIAGNOSIS — I82.402 DEEP VEIN THROMBOSIS (DVT) OF LEFT LOWER EXTREMITY, UNSPECIFIED CHRONICITY, UNSPECIFIED VEIN (MULTI): Primary | ICD-10-CM

## 2025-01-20 PROCEDURE — 99213 OFFICE O/P EST LOW 20 MIN: CPT | Performed by: OBSTETRICS & GYNECOLOGY

## 2025-01-20 PROCEDURE — 3008F BODY MASS INDEX DOCD: CPT | Performed by: OBSTETRICS & GYNECOLOGY

## 2025-01-20 PROCEDURE — 99214 OFFICE O/P EST MOD 30 MIN: CPT | Performed by: INTERNAL MEDICINE

## 2025-01-20 PROCEDURE — 1036F TOBACCO NON-USER: CPT | Performed by: OBSTETRICS & GYNECOLOGY

## 2025-01-20 ASSESSMENT — PAIN SCALES - GENERAL: PAINLEVEL_OUTOF10: 0-NO PAIN

## 2025-01-20 NOTE — PROGRESS NOTES
Patient ID: : Krystina Cates            Primary Care Provider: No Assigned PCP Generic MD Gabrielle    LOCATION:  [facility]    HEMATOLOGY ONCOLOGY PROBLEMS:  1.  Coagulopathy        a.  Heterozygous factor V Leiden mutation.  Tested as part of family screening.       b.  Left upper leg DVT in Oct 2024 at the time of delivery.       c.  Maintained on Lovenox.    CHIEF COMPLAINTS:  Patient is in the clinic today for evaluation of left upper leg DVT.    HISTORY:  Krystina Cates is a 34 y.o. female with known history of heterozygous factor V Leiden mutation.  Testing was done as part of family history of her grandmother being diagnosed with factor V Leiden mutation.  She never had any problem with thromboembolic complication but was treated with Lovenox during her first pregnancy prophylactically.  She had 5 additional pregnancies without any complications and without any prophylactic treatment.  During her seventh pregnancy she developed left leg discomfort just prior to her delivery.  Doppler ultrasound from 10/1/2024 showed a acute occlusive DVT in the distal external iliac, common femoral, profunda, proximal femoral, mid femoral, distal femoral and popliteal veins.  She was immediately started on heparin and had a normal vaginal delivery.  Postdelivery she is maintained on Lovenox.      INTERVAL HISTORY:  She is currently breast-feeding.  Denies any new complaints. No history of nausea, vomiting, fever, night sweats, diarrhea, rash, anorexia or weight loss. No recent changes in medications.  Latest follow-up Doppler ultrasound showed complete resolution of the left leg DVTs.    PAST MEDICAL HISTORY:  1.  Heterozygous factor V Leiden mutation.  2.  Left upper leg DVT in October 2024  3.  7 normal pregnancies and deliveries    SOCIAL HISTORY:   for 12 years and lives in [place].  Non-smoker.  Rare social alcohol intake.  She is a homemaker.  Born and raised in  [place].    FAMILY HISTORY:  Parents in their 60s in good health.  1 brother and 7 children ~ all alive and well.  Her mother, maternal grandmother and a cousin has also been noted to have heterozygous factor V Leiden mutation.  No other family history of bleeding, clotting or malignant disorders in the family history.    REVIEW OF SYSTEMS:   Pertinent finding as per the history above.  All other systems have been reviewed and generally negative and noncontributory.    VITAL SIGNS  BSA: There is no height or weight on file to calculate BSA.  /75   Pulse 82   Temp 36.9 °C (98.4 °F)   Resp 20   SpO2 99%     PHYSICAL EXAMINATION:  Detailed physical examination not done.    LAB DATA:  CBC at the time of delivery on 10/2/2024 was unremarkable with baseline hemoglobin of 10.6 and platelets of 146.    RADIOLOGICAL DATA:  Doppler ultrasound 01/072025  Conclusions:  Right Lower Venous: The right common femoral vein demonstrates normal spontaneous and respirophasic flow.  Left Lower Venous: No evidence of acute deep vein thrombus visualized in the left lower extremity.  Comparison:  Compared with study from 10/1/2024, left lower extremity DVT appears resolved.    ASSESSMENT / PLAN:  1.  Coagulopathy.  Please refer to the details as outlined above.  In summary patient with known history of history of negative factor V Leiden mutation, which was tested as part of family screening after her cousin and maternal grandmother were diagnosed with factor V Leiden mutation.  She never had any previous problems with thromboembolic complications during her 6 pregnancies.  During her first pregnancy she was treated prophylactically with Lovenox as a precaution but never needed any additional prophylactic treatment during other 5 pregnancies.  During her seventh pregnancy she developed left leg discomfort near just prior to her delivery.  Doppler ultrasound from 10/1/2024 showed a acute occlusive DVT in the distal external  iliac, common femoral, profunda, proximal femoral, mid femoral, distal femoral and popliteal veins.  She was immediately started on heparin and had a normal vaginal delivery.  Postdelivery she is maintained on Lovenox.      Latest follow-up Doppler ultrasound results are suggestive of complete resolution of previously noted left leg DVT.  For now she will hold the Lovenox for few days and thereafter we will check complete hypercoagulable panel.  Once the blood samples are drawn she will go back on Lovenox.  Depending upon testing results we will decide about the need for long-term anticoagulation.  Most likely she has a provoked event due to pregnancy in the setting of baseline heterozygous factor V Leiden mutation.  There are no clear-cut guidelines but I think, there is no need for lifelong anticoagulation at this time unless there are some additional abnormal findings.     2.  Follow-up.  She will get the blood work checked as detailed above.  Will contact her once the results are available.    This note has been transcribed using Dragon voice recognition system and there is a possibility of unintentional typing misprints.

## 2025-01-20 NOTE — PROGRESS NOTES
Subjective   Patient ID: Krystina Cates is a 34 y.o. female who presents for Follow-up (Patient here for follow up to dvt in leg after pregnancy/Pt states she has seen genetics today and will get a few more tests to see if dvt was pregnancy related/Pt states her clot is gone, but still has some pain on occasion  in leg).  HPI  Patient is a 34-year-old  7 para 7 who is status post spontaneous vaginal delivery on 2024, induced at 38 weeks because of diagnosis of DVT of left leg.  With a known history of factor V Leiden.  Patient currently on Lovenox she does see vascular and also hematology.  She continues to take Lovenox while breast-feeding.  Is having additional blood work done with hematology to determine if she needs long-term anticoagulation.  Currently she is asymptomatic.  Review of Systems    Objective   Physical Exam  Physical exam deferred  Assessment/Plan   Recent vaginal delivery 2024, pregnancy complicated by left lower extremity DVT.  Follows up with vascular and hematology.  Recent genetic testing done to determine if need for long-term anticoagulation.         Morgan Landin MD 25 3:18 PM

## 2025-01-23 ENCOUNTER — LAB (OUTPATIENT)
Dept: LAB | Facility: LAB | Age: 35
End: 2025-01-23
Payer: COMMERCIAL

## 2025-01-23 DIAGNOSIS — I82.402 DEEP VEIN THROMBOSIS (DVT) OF LEFT LOWER EXTREMITY, UNSPECIFIED CHRONICITY, UNSPECIFIED VEIN (MULTI): ICD-10-CM

## 2025-01-23 LAB
B2 GLYCOPROT1 IGA SER-ACNC: <0.6 U/ML
B2 GLYCOPROT1 IGG SER-ACNC: <1.4 U/ML
B2 GLYCOPROT1 IGM SER-ACNC: 0.3 U/ML
CARDIOLIPIN IGA SERPL-ACNC: <0.5 APL U/ML
CARDIOLIPIN IGG SER IA-ACNC: <1.6 GPL U/ML
CARDIOLIPIN IGM SER IA-ACNC: 0.4 MPL U/ML

## 2025-01-23 PROCEDURE — 36415 COLL VENOUS BLD VENIPUNCTURE: CPT

## 2025-01-23 PROCEDURE — 85302 CLOT INHIBIT PROT C ANTIGEN: CPT

## 2025-01-23 PROCEDURE — 81241 F5 GENE: CPT

## 2025-01-23 PROCEDURE — 85301 ANTITHROMBIN III ANTIGEN: CPT

## 2025-01-23 PROCEDURE — 86146 BETA-2 GLYCOPROTEIN ANTIBODY: CPT

## 2025-01-23 PROCEDURE — 86147 CARDIOLIPIN ANTIBODY EA IG: CPT

## 2025-01-23 PROCEDURE — 81240 F2 GENE: CPT

## 2025-01-25 LAB
AT III AG ACT/NOR PPP IA: 95 % (ref 82–136)
PROT C AG ACT/NOR PPP IA: >95 % (ref 63–153)

## 2025-01-27 LAB
ELECTRONICALLY SIGNED BY: ABNORMAL
ELECTRONICALLY SIGNED BY: NORMAL
FACTOR II-PROTHROMBIN INTERPRETATION: NORMAL
FACTOR II-PROTHROMBIN RESULT: NORMAL
FACTOR V LEIDEN INTERPRETATION: ABNORMAL
FACTOR V LEIDEN RESULT: ABNORMAL

## 2025-01-28 ENCOUNTER — TELEPHONE (OUTPATIENT)
Dept: HEMATOLOGY/ONCOLOGY | Facility: CLINIC | Age: 35
End: 2025-01-28
Payer: COMMERCIAL

## 2025-01-28 DIAGNOSIS — I82.402 DEEP VEIN THROMBOSIS (DVT) OF LEFT LOWER EXTREMITY, UNSPECIFIED CHRONICITY, UNSPECIFIED VEIN (MULTI): ICD-10-CM

## 2025-01-28 NOTE — TELEPHONE ENCOUNTER
Message left for patient that the lab that len her on 1.23 improperly processed three of the samples and we need to have them redrawn.  Orders for Protein S Antigen, Protein S Activity and Protein C activity re-entered. Awaiting patient's return call.

## 2025-01-29 ENCOUNTER — LAB (OUTPATIENT)
Dept: LAB | Facility: CLINIC | Age: 35
End: 2025-01-29
Payer: COMMERCIAL

## 2025-01-29 ENCOUNTER — APPOINTMENT (OUTPATIENT)
Dept: VASCULAR SURGERY | Facility: CLINIC | Age: 35
End: 2025-01-29
Payer: COMMERCIAL

## 2025-01-29 VITALS
SYSTOLIC BLOOD PRESSURE: 110 MMHG | BODY MASS INDEX: 27.35 KG/M2 | HEIGHT: 70 IN | DIASTOLIC BLOOD PRESSURE: 70 MMHG | WEIGHT: 191 LBS | HEART RATE: 95 BPM | OXYGEN SATURATION: 97 %

## 2025-01-29 DIAGNOSIS — I82.402 DEEP VEIN THROMBOSIS (DVT) OF LEFT LOWER EXTREMITY, UNSPECIFIED CHRONICITY, UNSPECIFIED VEIN (MULTI): ICD-10-CM

## 2025-01-29 DIAGNOSIS — I82.402 DEEP VEIN THROMBOSIS (DVT) OF LEFT LOWER EXTREMITY, UNSPECIFIED CHRONICITY, UNSPECIFIED VEIN (MULTI): Primary | ICD-10-CM

## 2025-01-29 PROCEDURE — 99213 OFFICE O/P EST LOW 20 MIN: CPT | Performed by: SURGERY

## 2025-01-29 PROCEDURE — 3008F BODY MASS INDEX DOCD: CPT | Performed by: SURGERY

## 2025-01-29 PROCEDURE — 1036F TOBACCO NON-USER: CPT | Performed by: SURGERY

## 2025-01-29 PROCEDURE — 36415 COLL VENOUS BLD VENIPUNCTURE: CPT

## 2025-01-30 NOTE — PROGRESS NOTES
"Krystina Cates is a 34 y.o. female     Subjective   This patient presents today for follow-up of her extensive DVT left lower extremity.  This developed during her last pregnancy just prior to delivery.  She states that her leg is doing much better.  She does get occasional achiness.  She currently denies any fever chills nausea vomiting or headache.  She continues to take full dose Lovenox on a daily basis.  She is also seeing our hematologist here at [place].         Objective     Vitals:    01/29/25 1100   BP: 110/70   Pulse: 95   SpO2: 97%      Physical Exam  This patient is alert and oriented x3 her head is normocephalic neck is soft and supple without palpable lymph nodes.  Heart is regular rate.  Lungs are clear.  Abdomen soft with positive bowel sounds.  There is no pain on palpation.  Upper and lower extremities have adequate range of motion with palpable brachial radial femoral and popliteal pulses.  Skin turgor is adequate.  Psychologically the patient appears to be acting appropriately.  Her left lower leg volume is slightly greater than her right.  There is no pain on palpation of her calf.  Blood pressure 110/70, pulse 95, height 1.778 m (5' 10\"), weight 86.6 kg (191 lb), SpO2 97%, currently breastfeeding.            Patient Active Problem List    Diagnosis Date Noted    Labor and delivery indication for care or intervention ([facility]) 10/01/2024    DVT (deep venous thrombosis) (Multi) 10/01/2024          Current Outpatient Medications:     nystatin (Mycostatin) ointment, Apply topically 2 times a day as needed (irritation). (Patient not taking: Reported on 11/25/2024), Disp: 15 g, Rfl: 1    triamcinolone (Kenalog) 0.1 % ointment, Apply topically 2 times a day as needed for irritation. (Patient not taking: Reported on 11/25/2024), Disp: 15 g, Rfl: 1     Lab Results   Component Value Date    WBC 10.5 10/02/2024    HGB 10.6 (L) 10/02/2024    HCT 32.8 (L) 10/02/2024     (L) 10/02/2024    ALT 10 " 10/06/2022    AST 13 10/06/2022     10/06/2022    K 3.9 10/06/2022     10/06/2022    CREATININE 0.66 10/06/2022    BUN 11 10/06/2022    CO2 25 10/06/2022    INR 0.9 10/02/2024       Lower extremity venous duplex left    Result Date: 1/8/2025           [facility] [address] [place] [address] Tel [phone] and Fax [phone]  Vascular Lab Report VASC US LOWER EXTREMITY VENOUS DUPLEX LEFT  Patient Name:      MEGAN CATHRYN LARIOS      Reading Physician:  [id number] Mila Wilson MD, RPVI Study Date:        1/7/2025             Ordering Physician: [id number] KATIE BLOOM MRN/PID:           [id number]             Technologist:       Lata Gonzales S Accession#:        [id number]         Technologist 2: Date of Birth/Age: 1990 / 34 years Encounter#:         [id number] Gender:            F Admission Status:  Outpatient           Location Performed: [facility]  Diagnosis/ICD: Acute embolism and thrombosis of unspecified deep veins of                unspecified lower extremity-I82.409 Indication:    DVT CPT Codes:     52761 Peripheral venous duplex scan for DVT Limited  Patient History Previous DVT.  CONCLUSIONS: Right Lower Venous: The right common femoral vein demonstrates normal spontaneous and respirophasic flow. Left Lower Venous: No evidence of acute deep vein thrombus visualized in the left lower extremity.  Comparison: Compared with study from 10/1/2024, left lower extremity DVT appears resolved.  Imaging & Doppler Findings:  Right        Flow CFV   Spontaneous/Phasic  Left                  Compress Thrombus        Flow Distal External Iliac   Yes      None CFV                     Yes      None   Spontaneous/Phasic PFV                     Yes      None FV Proximal             Yes      None   Spontaneous/Phasic FV Mid                  Yes      None FV Distal                Yes      None Popliteal               Yes      None   Spontaneous/Phasic Peroneal                Yes      None PTV                     Yes      None  [id number] Mila Wilson MD, DULCE Electronically signed by [id number] Mila Wilson MD, DULCE on 1/8/2025 at 1:00:14 PM  ** Final **                 Assessment/Plan   Assessment & Plan      This patient presents today for follow-up of her extensive deep vein thrombosis left lower extremity.  This occurred right before the birth of her last child.  She was started on anticoagulation I believe October 2.  She recently had a venous duplex scan done that does not show any residual clot in her left leg.  She is currently being worked up for hypercoagulable state with hematology.  If her blood work is negative for a hypercoagulable state, I do feel she can stop anticoagulation in approximately 1 month.  Hematology will also give their opinion concerning when she can stop.  I would like her to follow-up with me in 3 months.      Solo Guevara, DO

## 2025-02-04 LAB
PROT C ACT/NOR PPP CHRO: 141 % ACTIVITY (ref 70–150)
PROT S ACT/NOR PPP: 84 % ACTIVITY (ref 64–150)
PROT S FREE AG ACT/NOR PPP IA: 74 % (ref 55–124)

## 2025-02-06 DIAGNOSIS — I82.402 DEEP VEIN THROMBOSIS (DVT) OF LEFT LOWER EXTREMITY, UNSPECIFIED CHRONICITY, UNSPECIFIED VEIN (MULTI): ICD-10-CM

## 2025-03-05 ENCOUNTER — HOSPITAL ENCOUNTER (OUTPATIENT)
Dept: VASCULAR MEDICINE | Facility: HOSPITAL | Age: 35
Discharge: HOME | End: 2025-03-05
Payer: COMMERCIAL

## 2025-03-05 DIAGNOSIS — I82.409 DVT (DEEP VENOUS THROMBOSIS) (MULTI): ICD-10-CM

## 2025-03-05 DIAGNOSIS — M79.605 PAIN IN LEFT LEG: ICD-10-CM

## 2025-03-05 PROCEDURE — 93971 EXTREMITY STUDY: CPT

## 2025-03-05 PROCEDURE — 93971 EXTREMITY STUDY: CPT | Performed by: SURGERY

## 2025-03-06 ENCOUNTER — TELEMEDICINE (OUTPATIENT)
Dept: HEMATOLOGY/ONCOLOGY | Facility: CLINIC | Age: 35
End: 2025-03-06
Payer: COMMERCIAL

## 2025-03-06 DIAGNOSIS — I82.402 DEEP VEIN THROMBOSIS (DVT) OF LEFT LOWER EXTREMITY, UNSPECIFIED CHRONICITY, UNSPECIFIED VEIN (MULTI): ICD-10-CM

## 2025-03-06 PROCEDURE — 99214 OFFICE O/P EST MOD 30 MIN: CPT | Performed by: INTERNAL MEDICINE

## 2025-03-06 NOTE — PROGRESS NOTES
Patient ID: : Krystina Cates            Primary Care Provider: No Assigned PCP Generic Provider, MD    LOCATION:  Telehealth visit - [facility]    HEMATOLOGY ONCOLOGY PROBLEMS:  1.  Coagulopathy        a.  Heterozygous factor V Leiden mutation.  Tested as part of family screening.       b.  Left upper leg DVT in Oct 2024 at the time of delivery.       c.  Maintained on Lovenox from Oct 2024 to Feb 2025.    CHIEF COMPLAINTS:  Patient had a telehealth visit today for follow-up of coagulopathy.    HISTORY:  Krystina Cates is a 35 y.o. female with known history of heterozygous factor V Leiden mutation.  Testing was done as part of family history of her grandmother being diagnosed with factor V Leiden mutation.  She herself never had any problem with thromboembolic complications but was treated with Lovenox during her first pregnancy prophylactically.  She had 5 additional pregnancies without any complications and without any prophylactic treatments.  During her seventh pregnancy she developed left leg discomfort just prior to her delivery.  Doppler ultrasound from 10/1/2024 showed an acute occlusive DVT in the distal external iliac, common femoral, profunda, proximal femoral, mid femoral, distal femoral and popliteal veins.  She was immediately started on heparin and had a normal vaginal delivery.  Postdelivery she was maintained on Lovenox.  Follow-up Doppler ultrasound from January and March 2025 showed complete resolution of the DVTs.  Extensive detailed hypercoagulable workup from January 2025 mainly confirmed heterozygous factor V Leiden mutation without any additional hypercoagulable etiology.    INTERVAL HISTORY:  As per the patient she stopped Lovenox on her own last week.  She pulled her back and was having problem with the leg discomfort.  An urgent Doppler ultrasound done yesterday was unremarkable.  No history of nausea, vomiting, fever, night sweats, diarrhea, rash,  anorexia or weight loss. No recent changes in medications.     PAST MEDICAL HISTORY:  1.  Heterozygous factor V Leiden mutation.  2.  Left upper leg DVT in October 2024  3.  7 normal pregnancies and deliveries    SOCIAL HISTORY:   for 12 years and lives in [place].  Non-smoker.  Rare social alcohol intake.  She is a homemaker.  Born and raised in [place].    FAMILY HISTORY:  Parents in their 60s in good health.  1 brother and 7 children ~ all alive and well.  Her mother, maternal grandmother and a cousin has also been noted to have heterozygous factor V Leiden mutation.  No other family history of bleeding, clotting or malignant disorders in the family history.    REVIEW OF SYSTEMS:   Pertinent finding as per the history above.  All other systems have been reviewed and generally negative and noncontributory.    VITAL SIGNS  BSA: There is no height or weight on file to calculate BSA.  There were no vitals taken for this visit.    PHYSICAL EXAMINATION:  Physical examination not done.    LAB DATA:  Latest CBC and metabolic profile was unremarkable on 3/2/2025.  As detailed above extensive hypercoagulable workup was unremarkable in January 2025 other than heterozygous factor V Leiden mutation.      RADIOLOGICAL DATA:  Doppler ultrasound 3/5/2025   CONCLUSIONS:  Right Lower Venous: The right common femoral vein demonstrates normal spontaneous and respirophasic flow.  Left Lower Venous: No evidence of acute deep vein thrombus visualized in the left lower extremity.    Doppler ultrasound 01/07/2025  Conclusions:  Right Lower Venous: The right common femoral vein demonstrates normal spontaneous and respirophasic flow.  Left Lower Venous: No evidence of acute deep vein thrombus visualized in the left lower extremity.  Comparison:  Compared with study from 10/1/2024, left lower extremity DVT appears resolved.    ASSESSMENT / PLAN:  1.  Coagulopathy.  Please refer to the details as outlined above.  In summary  patient with known history of history of heterozygous factor V Leiden mutation, which was tested as part of family screening after her cousin and maternal grandmother were diagnosed with factor V Leiden mutation.  She never had any previous problems with thromboembolic complications during her 6 pregnancies.  During her first pregnancy she was treated prophylactically with Lovenox as a precaution but never needed any additional prophylactic treatment during other 5 pregnancies.  During her seventh pregnancy she developed left leg discomfort near just prior to her delivery.  Doppler ultrasound from 10/1/2024 showed a acute occlusive DVT in the distal external iliac, common femoral, profunda, proximal femoral, mid femoral, distal femoral and popliteal veins.  She was immediately started on heparin and had a normal vaginal delivery.  Postdelivery she was maintained on Lovenox.  Follow-up Doppler ultrasound from January and March 2025 showed complete resolution of the DVTs.  Extensive detailed hypercoagulable workup from January 2025 mainly confirmed heterozygous factor V Leiden mutation without any additional hypercoagulable etiology.    Latest follow-up Doppler ultrasound results are suggestive of complete resolution of previously noted left leg DVT.  Most likely she has a provoked event due to pregnancy in the setting of baseline heterozygous factor V Leiden mutation.  Extensive additional hypercoagulable workup was unremarkable.  There are no clear-cut guidelines but I think, there is no need for lifelong anticoagulation at this time.  Obviously she will need prophylactic treatment if she decide to have more pregnancies.  Patient is also explained that in the future if she has any other episodes of thromboembolic complications then she will need lifelong anticoagulation.  She has already stopped Lovenox for unknown and at this time I advised her to transition to low-dose aspirin.      2.  Follow-up.  She will  follow-up with us on an as-needed basis.  I wish her all the best.    Informed consent was obtained today for telehealth visit.    This note has been transcribed using Dragon voice recognition system and there is a possibility of unintentional typing misprints.

## 2025-04-30 ENCOUNTER — APPOINTMENT (OUTPATIENT)
Dept: VASCULAR SURGERY | Facility: CLINIC | Age: 35
End: 2025-04-30
Payer: COMMERCIAL

## 2025-04-30 VITALS
HEART RATE: 85 BPM | BODY MASS INDEX: 27.35 KG/M2 | OXYGEN SATURATION: 99 % | HEIGHT: 70 IN | WEIGHT: 191 LBS | SYSTOLIC BLOOD PRESSURE: 118 MMHG | DIASTOLIC BLOOD PRESSURE: 68 MMHG

## 2025-04-30 DIAGNOSIS — I82.409 DEEP VEIN THROMBOSIS (DVT) OF LOWER EXTREMITY, UNSPECIFIED CHRONICITY, UNSPECIFIED LATERALITY, UNSPECIFIED VEIN: Primary | ICD-10-CM

## 2025-04-30 PROCEDURE — 3008F BODY MASS INDEX DOCD: CPT | Performed by: SURGERY

## 2025-04-30 PROCEDURE — 99213 OFFICE O/P EST LOW 20 MIN: CPT | Performed by: SURGERY

## 2025-04-30 PROCEDURE — 1036F TOBACCO NON-USER: CPT | Performed by: SURGERY

## 2025-04-30 NOTE — PROGRESS NOTES
"Krystina Cates is a 35 y.o. female     Subjective   This patient presents today for follow-up of her extensive deep vein thrombosis left leg.  She developed her DVT prior to her last childbirth.  Her last child was #7.  She states that her left leg is doing extremely well.  She does exercise now.  She has never smoked.  She is 5 foot 10 and weighs 191 pounds.  She currently denies any fever chills nausea vomiting or headache.         Objective     Vitals:    04/30/25 0900   BP: 118/68   Pulse: 85   SpO2: 99%      Physical Exam  This patient is alert and oriented x3 her head is normocephalic neck is soft and supple without palpable lymph nodes.  Heart is regular rate.  Lungs are clear.  Abdomen soft with positive bowel sounds.  There is no pain on palpation.  Upper and lower extremities have adequate range of motion with palpable brachial radial femoral and popliteal pulses.  Skin turgor is adequate.  Psychologically the patient appears to be acting appropriately.  Her left calf is slightly larger than her right.  Blood pressure 118/68, pulse 85, height 1.778 m (5' 10\"), weight 86.6 kg (191 lb), SpO2 99%, currently breastfeeding.            Patient Active Problem List    Diagnosis Date Noted    Labor and delivery indication for care or intervention ([facility]) 10/01/2024    DVT (deep venous thrombosis) ([facility]) 10/01/2024        Current Medications[1]     Lab Results   Component Value Date    WBC 10.5 10/02/2024    HGB 10.6 (L) 10/02/2024    HCT 32.8 (L) 10/02/2024     (L) 10/02/2024    ALT 10 10/06/2022    AST 13 10/06/2022     10/06/2022    K 3.9 10/06/2022     10/06/2022    CREATININE 0.66 10/06/2022    BUN 11 10/06/2022    CO2 25 10/06/2022    INR 0.9 10/02/2024       Lower extremity venous duplex left  Result Date: 3/5/2025           [address] [address] Tel [phone] and Fax [phone]  Vascular Lab Report [facility] US LOWER EXTREMITY VENOUS DUPLEX LEFT  " Patient Name:      MEGAN LARIOS     Reading Physician:  [id number] Conner Stewart MD Study Date:        3/5/2025            Ordering Physician: [id number] KATIE BLOOM MRN/PID:           [id number]            Technologist:       Leigha Lacey T Accession#:        [id number]        Technologist 2: Date of Birth/Age: 1990 / 35      Encounter#:         [id number]                    years Gender:            F Admission Status:  Outpatient          Location Performed: [facility]  Diagnosis/ICD: Pain in left leg-M79.605 CPT Codes:     44081 Peripheral venous duplex scan for DVT Limited  CONCLUSIONS: Right Lower Venous: The right common femoral vein demonstrates normal spontaneous and respirophasic flow. Left Lower Venous: No evidence of acute deep vein thrombus visualized in the left lower extremity.  Comparison: Compared with study from 1/7/2025, no significant change.  Imaging & Doppler Findings:  Right        Flow CFV   Spontaneous/Phasic  Left                  Compress Thrombus        Flow Distal External Iliac   Yes      None   Spontaneous/Phasic CFV                     Yes      None   Spontaneous/Phasic PFV                     Yes      None FV Proximal             Yes      None   Spontaneous/Phasic FV Mid                  Yes      None FV Distal               Yes      None Popliteal               Yes      None   Spontaneous/Phasic Peroneal                Yes      None PTV                     Yes      None  [id number] Conner Stewart MD Electronically signed by [id number] Conner Stewart MD on 3/5/2025 at 10:22:11 AM  ** Final **                 Assessment/Plan   Assessment & Plan      This patient presents today for follow-up of her history of extensive deep vein thrombosis left leg.  She was diagnosed postpartum from her last child which was very dangerous situation.  She did take Lovenox for quite a  few months.  Her child is now 6 months old.  She is now currently taking aspirin.  Her last venous duplex scan was March 5 of this year which was negative for any acute or chronic DVT.  Overall, I am extremely pleased with her results.  I do feel she would be considered very high risk if she goes through another pregnancy.  Her hematologist here at [facility] has been very active and managing her anticoagulation.  He did mention that she would need to stay on Lovenox throughout her pregnancy  If she were to become pregnant again.  She can follow-up with me as needed      Solo Guevara, DO          [1]   Current Outpatient Medications:     BABY ASPIRIN ORAL, Take by mouth., Disp: , Rfl:     nystatin (Mycostatin) ointment, Apply topically 2 times a day as needed (irritation). (Patient not taking: Reported on 11/25/2024), Disp: 15 g, Rfl: 1    triamcinolone (Kenalog) 0.1 % ointment, Apply topically 2 times a day as needed for irritation. (Patient not taking: Reported on 11/25/2024), Disp: 15 g, Rfl: 1